# Patient Record
Sex: FEMALE | Race: OTHER | HISPANIC OR LATINO | Employment: FULL TIME | URBAN - METROPOLITAN AREA
[De-identification: names, ages, dates, MRNs, and addresses within clinical notes are randomized per-mention and may not be internally consistent; named-entity substitution may affect disease eponyms.]

---

## 2021-05-25 ENCOUNTER — TELEPHONE (OUTPATIENT)
Dept: SCHEDULING | Facility: IMAGING CENTER | Age: 31
End: 2021-05-25

## 2021-06-03 SDOH — ECONOMIC STABILITY: INCOME INSECURITY: IN THE LAST 12 MONTHS, WAS THERE A TIME WHEN YOU WERE NOT ABLE TO PAY THE MORTGAGE OR RENT ON TIME?: NO

## 2021-06-03 SDOH — ECONOMIC STABILITY: TRANSPORTATION INSECURITY
IN THE PAST 12 MONTHS, HAS LACK OF TRANSPORTATION KEPT YOU FROM MEETINGS, WORK, OR FROM GETTING THINGS NEEDED FOR DAILY LIVING?: NO

## 2021-06-03 SDOH — ECONOMIC STABILITY: HOUSING INSECURITY
IN THE LAST 12 MONTHS, WAS THERE A TIME WHEN YOU DID NOT HAVE A STEADY PLACE TO SLEEP OR SLEPT IN A SHELTER (INCLUDING NOW)?: NO

## 2021-06-03 SDOH — HEALTH STABILITY: MENTAL HEALTH
STRESS IS WHEN SOMEONE FEELS TENSE, NERVOUS, ANXIOUS, OR CAN'T SLEEP AT NIGHT BECAUSE THEIR MIND IS TROUBLED. HOW STRESSED ARE YOU?: ONLY A LITTLE

## 2021-06-03 SDOH — HEALTH STABILITY: PHYSICAL HEALTH: ON AVERAGE, HOW MANY MINUTES DO YOU ENGAGE IN EXERCISE AT THIS LEVEL?: 30 MIN

## 2021-06-03 SDOH — ECONOMIC STABILITY: TRANSPORTATION INSECURITY
IN THE PAST 12 MONTHS, HAS LACK OF RELIABLE TRANSPORTATION KEPT YOU FROM MEDICAL APPOINTMENTS, MEETINGS, WORK OR FROM GETTING THINGS NEEDED FOR DAILY LIVING?: NO

## 2021-06-03 SDOH — ECONOMIC STABILITY: HOUSING INSECURITY: IN THE LAST 12 MONTHS, HOW MANY PLACES HAVE YOU LIVED?: 2

## 2021-06-03 SDOH — ECONOMIC STABILITY: FOOD INSECURITY: WITHIN THE PAST 12 MONTHS, THE FOOD YOU BOUGHT JUST DIDN'T LAST AND YOU DIDN'T HAVE MONEY TO GET MORE.: NEVER TRUE

## 2021-06-03 SDOH — ECONOMIC STABILITY: INCOME INSECURITY: HOW HARD IS IT FOR YOU TO PAY FOR THE VERY BASICS LIKE FOOD, HOUSING, MEDICAL CARE, AND HEATING?: NOT VERY HARD

## 2021-06-03 SDOH — ECONOMIC STABILITY: FOOD INSECURITY: WITHIN THE PAST 12 MONTHS, YOU WORRIED THAT YOUR FOOD WOULD RUN OUT BEFORE YOU GOT MONEY TO BUY MORE.: NEVER TRUE

## 2021-06-03 SDOH — ECONOMIC STABILITY: TRANSPORTATION INSECURITY
IN THE PAST 12 MONTHS, HAS THE LACK OF TRANSPORTATION KEPT YOU FROM MEDICAL APPOINTMENTS OR FROM GETTING MEDICATIONS?: NO

## 2021-06-03 SDOH — HEALTH STABILITY: PHYSICAL HEALTH: ON AVERAGE, HOW MANY DAYS PER WEEK DO YOU ENGAGE IN MODERATE TO STRENUOUS EXERCISE (LIKE A BRISK WALK)?: 1 DAY

## 2021-06-03 ASSESSMENT — SOCIAL DETERMINANTS OF HEALTH (SDOH)
HOW OFTEN DO YOU ATTEND CHURCH OR RELIGIOUS SERVICES?: NEVER
HOW OFTEN DO YOU ATTEND CHURCH OR RELIGIOUS SERVICES?: NEVER
WITHIN THE PAST 12 MONTHS, YOU WORRIED THAT YOUR FOOD WOULD RUN OUT BEFORE YOU GOT THE MONEY TO BUY MORE: NEVER TRUE
HOW HARD IS IT FOR YOU TO PAY FOR THE VERY BASICS LIKE FOOD, HOUSING, MEDICAL CARE, AND HEATING?: NOT VERY HARD
HOW OFTEN DO YOU HAVE A DRINK CONTAINING ALCOHOL: MONTHLY OR LESS
HOW OFTEN DO YOU ATTENT MEETINGS OF THE CLUB OR ORGANIZATION YOU BELONG TO?: NEVER
HOW OFTEN DO YOU GET TOGETHER WITH FRIENDS OR RELATIVES?: ONCE A WEEK
DO YOU BELONG TO ANY CLUBS OR ORGANIZATIONS SUCH AS CHURCH GROUPS UNIONS, FRATERNAL OR ATHLETIC GROUPS, OR SCHOOL GROUPS?: NO
HOW MANY DRINKS CONTAINING ALCOHOL DO YOU HAVE ON A TYPICAL DAY WHEN YOU ARE DRINKING: 1 OR 2
IN A TYPICAL WEEK, HOW MANY TIMES DO YOU TALK ON THE PHONE WITH FAMILY, FRIENDS, OR NEIGHBORS?: THREE TIMES A WEEK
HOW OFTEN DO YOU ATTENT MEETINGS OF THE CLUB OR ORGANIZATION YOU BELONG TO?: NEVER
DO YOU BELONG TO ANY CLUBS OR ORGANIZATIONS SUCH AS CHURCH GROUPS UNIONS, FRATERNAL OR ATHLETIC GROUPS, OR SCHOOL GROUPS?: NO
IN A TYPICAL WEEK, HOW MANY TIMES DO YOU TALK ON THE PHONE WITH FAMILY, FRIENDS, OR NEIGHBORS?: THREE TIMES A WEEK
HOW OFTEN DO YOU HAVE SIX OR MORE DRINKS ON ONE OCCASION: LESS THAN MONTHLY
HOW OFTEN DO YOU GET TOGETHER WITH FRIENDS OR RELATIVES?: ONCE A WEEK

## 2021-06-03 ASSESSMENT — LIFESTYLE VARIABLES
HOW OFTEN DO YOU HAVE SIX OR MORE DRINKS ON ONE OCCASION: LESS THAN MONTHLY
HOW OFTEN DO YOU HAVE A DRINK CONTAINING ALCOHOL: MONTHLY OR LESS
HOW MANY STANDARD DRINKS CONTAINING ALCOHOL DO YOU HAVE ON A TYPICAL DAY: 1 OR 2

## 2021-06-04 ENCOUNTER — HOSPITAL ENCOUNTER (OUTPATIENT)
Dept: LAB | Facility: MEDICAL CENTER | Age: 31
End: 2021-06-04
Attending: FAMILY MEDICINE
Payer: COMMERCIAL

## 2021-06-04 ENCOUNTER — OFFICE VISIT (OUTPATIENT)
Dept: MEDICAL GROUP | Facility: MEDICAL CENTER | Age: 31
End: 2021-06-04
Payer: COMMERCIAL

## 2021-06-04 VITALS
DIASTOLIC BLOOD PRESSURE: 80 MMHG | HEART RATE: 70 BPM | TEMPERATURE: 98.7 F | BODY MASS INDEX: 38.09 KG/M2 | RESPIRATION RATE: 16 BRPM | WEIGHT: 215 LBS | HEIGHT: 63 IN | OXYGEN SATURATION: 97 % | SYSTOLIC BLOOD PRESSURE: 118 MMHG

## 2021-06-04 DIAGNOSIS — R19.7 DIARRHEA DUE TO MALABSORPTION: ICD-10-CM

## 2021-06-04 DIAGNOSIS — Z13.220 ENCOUNTER FOR LIPID SCREENING FOR CARDIOVASCULAR DISEASE: ICD-10-CM

## 2021-06-04 DIAGNOSIS — N93.9 ABNORMAL UTERINE BLEEDING: ICD-10-CM

## 2021-06-04 DIAGNOSIS — K90.9 DIARRHEA DUE TO MALABSORPTION: ICD-10-CM

## 2021-06-04 DIAGNOSIS — E66.9 OBESITY (BMI 35.0-39.9 WITHOUT COMORBIDITY): ICD-10-CM

## 2021-06-04 DIAGNOSIS — Z13.21 ENCOUNTER FOR VITAMIN DEFICIENCY SCREENING: ICD-10-CM

## 2021-06-04 DIAGNOSIS — Z13.6 ENCOUNTER FOR LIPID SCREENING FOR CARDIOVASCULAR DISEASE: ICD-10-CM

## 2021-06-04 LAB
ALBUMIN SERPL BCP-MCNC: 4.2 G/DL (ref 3.2–4.9)
ALBUMIN/GLOB SERPL: 1.2 G/DL
ALP SERPL-CCNC: 89 U/L (ref 30–99)
ALT SERPL-CCNC: 14 U/L (ref 2–50)
ANION GAP SERPL CALC-SCNC: 12 MMOL/L (ref 7–16)
AST SERPL-CCNC: 12 U/L (ref 12–45)
BILIRUB SERPL-MCNC: 0.3 MG/DL (ref 0.1–1.5)
BUN SERPL-MCNC: 9 MG/DL (ref 8–22)
CALCIUM SERPL-MCNC: 8.9 MG/DL (ref 8.4–10.2)
CHLORIDE SERPL-SCNC: 106 MMOL/L (ref 96–112)
CHOLEST SERPL-MCNC: 184 MG/DL (ref 100–199)
CO2 SERPL-SCNC: 21 MMOL/L (ref 20–33)
CREAT SERPL-MCNC: 0.43 MG/DL (ref 0.5–1.4)
ERYTHROCYTE [DISTWIDTH] IN BLOOD BY AUTOMATED COUNT: 44 FL (ref 35.9–50)
FASTING STATUS PATIENT QL REPORTED: NORMAL
GLOBULIN SER CALC-MCNC: 3.4 G/DL (ref 1.9–3.5)
GLUCOSE SERPL-MCNC: 97 MG/DL (ref 65–99)
HCT VFR BLD AUTO: 38.1 % (ref 37–47)
HDLC SERPL-MCNC: 36 MG/DL
HGB BLD-MCNC: 12.4 G/DL (ref 12–16)
LDLC SERPL CALC-MCNC: 124 MG/DL
MCH RBC QN AUTO: 27.2 PG (ref 27–33)
MCHC RBC AUTO-ENTMCNC: 32.5 G/DL (ref 33.6–35)
MCV RBC AUTO: 83.6 FL (ref 81.4–97.8)
PLATELET # BLD AUTO: 280 K/UL (ref 164–446)
PMV BLD AUTO: 10.2 FL (ref 9–12.9)
POTASSIUM SERPL-SCNC: 4.5 MMOL/L (ref 3.6–5.5)
PROT SERPL-MCNC: 7.6 G/DL (ref 6–8.2)
RBC # BLD AUTO: 4.56 M/UL (ref 4.2–5.4)
SODIUM SERPL-SCNC: 139 MMOL/L (ref 135–145)
TRIGL SERPL-MCNC: 120 MG/DL (ref 0–149)
WBC # BLD AUTO: 7.2 K/UL (ref 4.8–10.8)

## 2021-06-04 PROCEDURE — 99204 OFFICE O/P NEW MOD 45 MIN: CPT | Performed by: FAMILY MEDICINE

## 2021-06-04 PROCEDURE — 80061 LIPID PANEL: CPT

## 2021-06-04 PROCEDURE — 85027 COMPLETE CBC AUTOMATED: CPT

## 2021-06-04 PROCEDURE — 82306 VITAMIN D 25 HYDROXY: CPT

## 2021-06-04 PROCEDURE — 36415 COLL VENOUS BLD VENIPUNCTURE: CPT

## 2021-06-04 PROCEDURE — 80053 COMPREHEN METABOLIC PANEL: CPT

## 2021-06-04 RX ORDER — ETONOGESTREL 68 MG/1
1 IMPLANT SUBCUTANEOUS ONCE
COMMUNITY
End: 2021-07-20

## 2021-06-04 ASSESSMENT — ENCOUNTER SYMPTOMS
SHORTNESS OF BREATH: 0
DIARRHEA: 0
BLURRED VISION: 0
WEIGHT LOSS: 0
ABDOMINAL PAIN: 1
DOUBLE VISION: 0
FEVER: 0
PALPITATIONS: 0
COUGH: 0
MYALGIAS: 0
NAUSEA: 0
CHILLS: 0
DIZZINESS: 0
WEAKNESS: 0
CONSTIPATION: 0
BRUISES/BLEEDS EASILY: 0
DEPRESSION: 0

## 2021-06-04 ASSESSMENT — PATIENT HEALTH QUESTIONNAIRE - PHQ9: CLINICAL INTERPRETATION OF PHQ2 SCORE: 0

## 2021-06-04 NOTE — PROGRESS NOTES
lApa Ordoñez is a pleasant 30 y.o. female here to establish care.    HPI:   Diarrhea due to malabsorption  Patient reports that she has been seen by gastroenterology in the past was diagnosed with GERD.  She recently was seen at a urgent care in [facility] and was told that she may have irritable bowel.    Patient states that ever since she had her gallbladder removed back in 2011 she has been having episodes where shortly after she eats she needs to have a bowel movement.  She describes her stool as yellow and watery during those events.  She states that she does not notice a change when she eats fatty foods but when she drinks something like a protein shake she will noticed increase in her symptoms.    Since moving to [place] she has made improvements upon her diet which she has notices improved her symptoms.  At this time she is not interested in gastroenterology but will reach out if her symptoms worsen.    Abnormal uterine bleeding  Patient reports that she has been having difficulty with her menstruation ever since she started having her periods.  She does report a positive family history for her cervical cancer, ovarian cancer, and fibroids.  She became concerned after speaking with family as they were displaying similar symptoms that she has.  She reports intense amount of pain and mood changes prior to her menstrual cycle.  She has been seen by GYN in the past and was placed on hormonal contraception, Nexplanon.  She states that for 2 years she was symptom-free along with free of her period.  She has noticed that over the last year her symptoms have been slowly increasing and becoming more frequent.  She would like to follow-up with GYN as she is interested in getting a hysterectomy or an ablation to help with her symptoms.      Health Maintenance  Patient is due for her Pap smear but like to be seen by GYN to schedule this.    Current medicines (including changes today)  Current Outpatient  Medications   Medication Sig Dispense Refill   • etonogestrel (NEXPLANON) 68 MG Implant implant Inject 1 Each under the skin one time. Placed 2018       No current facility-administered medications for this visit.       Past Medical/ Surgical History  She  has a past medical history of Allergy, Anemia, Anxiety, and Depression.  She  has a past surgical history that includes cholecystectomy ().    Social History  Social History     Tobacco Use   • Smoking status: Never Smoker   • Smokeless tobacco: Never Used   Vaping Use   • Vaping Use: Some days   • Substances: THC, CBD   Substance Use Topics   • Alcohol use: Yes     Comment: ragini   • Drug use: Yes     Types: Marijuana     Social History     Social History Narrative   • Not on file        Family History  Family History   Problem Relation Age of Onset   • Depression Mother    • Other Mother         fatigue and dizziness   • Obesity Father    • Allergies Brother    • Arthritis Maternal Grandmother    • Hypertension Maternal Grandmother    • Diabetes Maternal Grandmother    • Diabetes Maternal Grandfather    • Cancer Paternal Grandmother         ovarian   • Hypertension Paternal Grandmother    • Hypertension Paternal Grandfather    • Parkinson's Disease Paternal Grandfather    • Alzheimer's Disease Paternal Grandfather    • Cancer Maternal Aunt         cervical   • Other Maternal Aunt         fibroids     Family Status   Relation Name Status   • Mo  Alive   • Fa  Alive   • Bro Bin Alive   • MGMo  Alive   • MGFa     • PGMo  Alive   • PGFa  Alive   • MAunt  Alive         Review of Systems   Constitutional: Negative for chills, fever and weight loss.   HENT: Negative for hearing loss.    Eyes: Negative for blurred vision and double vision.   Respiratory: Negative for cough and shortness of breath.    Cardiovascular: Negative for chest pain, palpitations and leg swelling.   Gastrointestinal: Positive for abdominal pain (Lower abdominal cramping  "associated with it.). Negative for constipation, diarrhea and nausea.   Genitourinary: Negative.    Musculoskeletal: Negative for myalgias.   Skin: Negative for rash.   Neurological: Negative for dizziness and weakness.   Endo/Heme/Allergies: Does not bruise/bleed easily.   Psychiatric/Behavioral: Negative for depression.         Objective:     /80 (BP Location: Left arm, Patient Position: Sitting, BP Cuff Size: Adult)   Pulse 70   Temp 37.1 °C (98.7 °F) (Temporal)   Resp 16   Ht 1.6 m (5' 3\")   Wt 97.5 kg (215 lb)   SpO2 97%  Body mass index is 38.09 kg/m².    Physical Exam  Constitutional:       General: She is not in acute distress.  HENT:      Head: Normocephalic and atraumatic.      Right Ear: External ear normal.      Left Ear: External ear normal.   Eyes:      Conjunctiva/sclera: Conjunctivae normal.      Pupils: Pupils are equal, round, and reactive to light.   Cardiovascular:      Rate and Rhythm: Normal rate and regular rhythm.      Heart sounds: No murmur heard.   No friction rub. No gallop.    Pulmonary:      Effort: Pulmonary effort is normal.      Breath sounds: Normal breath sounds. No wheezing or rales.   Abdominal:      General: Bowel sounds are normal.      Palpations: Abdomen is soft.      Tenderness: There is abdominal tenderness (Menstruation present.) in the right lower quadrant and left lower quadrant.   Musculoskeletal:      Cervical back: Normal range of motion and neck supple.   Skin:     General: Skin is warm and dry.      Findings: No rash.   Neurological:      Mental Status: She is alert and oriented to person, place, and time.      Gait: Gait is intact.   Psychiatric:         Mood and Affect: Affect normal.        Imaging:  No recent imaging to review    Labs:  No recent blood work to review  Assessment and Plan:   The following treatment plan was discussed    1. Diarrhea due to malabsorption  Chronic, controlled.  Patient was not interested in seeing gastroenterology at " this time as she has made dietary changes which showed improvement in her symptoms.  We will go ahead and check her chemistry to ensure that this is not related to an electrolyte abnormality  - ESTIMATED GFR; Future  - Comp Metabolic Panel; Future    2. Abnormal uterine bleeding  Chronic, uncontrolled.  Referral has been placed to GYN as patient is interested in surgical procedures to help with her dysmenorrhea.  She is also interested in getting her Nexplanon removed.  - REFERRAL TO GYNECOLOGY  - CBC WITHOUT DIFFERENTIAL; Future    3. Encounter for lipid screening for cardiovascular disease  Patient would like to have their blood work done, no recent blood work completed.  Orders placed.  - Lipid Profile; Future    4. Encounter for vitamin deficiency screening  Patient would like to have their blood work done, no recent blood work completed.  Orders placed.  - VITAMIN D,25 HYDROXY; Future    5. Obesity (BMI 35.0-39.9 without comorbidity)  We will discuss this more in depth and unexplained together.  - Patient identified as having weight management issue.  Appropriate orders and counseling given.      Records requested.  Followup: Return in about 6 weeks (around 7/16/2021) for follow-up.    Please note that this dictation was created using voice recognition software. I have made every reasonable attempt to correct obvious errors, but I expect that there are errors of grammar and possibly content that I did not discover before finalizing the note.

## 2021-06-04 NOTE — ASSESSMENT & PLAN NOTE
Patient reports that she has been seen by gastroenterology in the past was diagnosed with GERD.  She recently was seen at a urgent care in [facility] and was told that she may have irritable bowel.    Patient states that ever since she had her gallbladder removed back in 2011 she has been having episodes where shortly after she eats she needs to have a bowel movement.  She describes her stool as yellow and watery during those events.  She states that she does not notice a change when she eats fatty foods but when she drinks something like a protein shake she will noticed increase in her symptoms.    Since moving to [place] she has made improvements upon her diet which she has notices improved her symptoms.  At this time she is not interested in gastroenterology but will reach out if her symptoms worsen.

## 2021-06-04 NOTE — ASSESSMENT & PLAN NOTE
Patient reports that she has been having difficulty with her menstruation ever since she started having her periods.  She does report a positive family history for her cervical cancer, ovarian cancer, and fibroids.  She became concerned after speaking with family as they were displaying similar symptoms that she has.  She reports intense amount of pain and mood changes prior to her menstrual cycle.  She has been seen by GYN in the past and was placed on hormonal contraception, Nexplanon.  She states that for 2 years she was symptom-free along with free of her period.  She has noticed that over the last year her symptoms have been slowly increasing and becoming more frequent.  She would like to follow-up with GYN as she is interested in getting a hysterectomy or an ablation to help with her symptoms.

## 2021-06-06 LAB — 25(OH)D3 SERPL-MCNC: 10 NG/ML (ref 30–80)

## 2021-06-29 ENCOUNTER — PATIENT MESSAGE (OUTPATIENT)
Dept: MEDICAL GROUP | Facility: MEDICAL CENTER | Age: 31
End: 2021-06-29

## 2021-06-29 DIAGNOSIS — Z12.83 SKIN CANCER SCREENING: ICD-10-CM

## 2021-06-30 ENCOUNTER — HOSPITAL ENCOUNTER (OUTPATIENT)
Facility: MEDICAL CENTER | Age: 31
End: 2021-06-30
Attending: OBSTETRICS & GYNECOLOGY
Payer: COMMERCIAL

## 2021-06-30 ENCOUNTER — GYNECOLOGY VISIT (OUTPATIENT)
Dept: OBGYN | Facility: CLINIC | Age: 31
End: 2021-06-30
Payer: COMMERCIAL

## 2021-06-30 VITALS — DIASTOLIC BLOOD PRESSURE: 80 MMHG | BODY MASS INDEX: 38.26 KG/M2 | SYSTOLIC BLOOD PRESSURE: 124 MMHG | WEIGHT: 216 LBS

## 2021-06-30 DIAGNOSIS — Z12.4 CERVICAL CANCER SCREENING: ICD-10-CM

## 2021-06-30 DIAGNOSIS — N93.9 ABNORMAL UTERINE BLEEDING (AUB): ICD-10-CM

## 2021-06-30 DIAGNOSIS — Z97.5 BREAKTHROUGH BLEEDING ON NEXPLANON: ICD-10-CM

## 2021-06-30 DIAGNOSIS — N92.1 BREAKTHROUGH BLEEDING ON NEXPLANON: ICD-10-CM

## 2021-06-30 DIAGNOSIS — Z01.419 ENCNTR FOR GYN EXAM (GENERAL) (ROUTINE) W/O ABN FINDINGS: ICD-10-CM

## 2021-06-30 DIAGNOSIS — E66.9 OBESITY (BMI 35.0-39.9 WITHOUT COMORBIDITY): ICD-10-CM

## 2021-06-30 DIAGNOSIS — Z30.09 STERILIZATION CONSULT: ICD-10-CM

## 2021-06-30 PROCEDURE — 99385 PREV VISIT NEW AGE 18-39: CPT | Performed by: OBSTETRICS & GYNECOLOGY

## 2021-06-30 PROCEDURE — 88175 CYTOPATH C/V AUTO FLUID REDO: CPT

## 2021-06-30 PROCEDURE — 87624 HPV HI-RISK TYP POOLED RSLT: CPT

## 2021-06-30 ASSESSMENT — FIBROSIS 4 INDEX: FIB4 SCORE: 0.37

## 2021-06-30 NOTE — NON-PROVIDER
New patient - Annual  LMP: 6/21/21  Phone: [phone]  Pharmacy verified   Pt would like to discuss Nexplanon removal and Hysterectomy. Pt has Hx of painful periods x 15 years

## 2021-06-30 NOTE — PROGRESS NOTES
Subjective:      Alpa Ordoñez is a 30 y.o. female who presents as New Patient (Annual)            HPI patient is a 30-year-old  0 who presents today as a new patient for annual gynecologic exam with several complaints.  Patient was previously living in [place] before she moved here.  She had Nexplanon placed in [place] and Nexplanon has been in place for 3 years in about 3 months.  Patient reports 6+ month of abnormal unpredictable bleeding.  Prior to this she has had amenorrhea since Nexplanon placement.  She also reports significant cramping and pelvic discomfort with bleeding.  She does not have much pain symptoms outside of her bleeding episodes.    Patient reports no history of PID STDs or abnormal Pap smears.  Her last Pap smear was 3+ years ago.  Patient is sexually active and states she has been using condoms.    Patient states she does not want any pregnancy in the future and would like to discuss sterilization option versus hysterectomy versus ablation for bleeding control.    She has had some chronic intermittent loose bowel movements since gallbladder surgery and states she is being worked up for possible IBS also by her PCP.    Reports no changes in bladder functions.    ROS all organ systems are reviewed and were negative except for complaints in HPI       Objective:     /80 (BP Location: Right arm, Patient Position: Sitting, BP Cuff Size: Adult long)   Wt 98 kg (216 lb)   LMP 2021   BMI 38.26 kg/m²      Physical Exam  Vitals and nursing note reviewed. Exam conducted with a chaperone present.   Constitutional:       General: She is not in acute distress.     Appearance: Normal appearance. She is obese. She is not toxic-appearing.   HENT:      Head: Normocephalic and atraumatic.      Nose: Nose normal.   Eyes:      General: No scleral icterus.        Right eye: No discharge.         Left eye: No discharge.      Conjunctiva/sclera: Conjunctivae normal.   Cardiovascular:       Rate and Rhythm: Normal rate and regular rhythm.      Pulses: Normal pulses.      Heart sounds: Normal heart sounds. No murmur heard.   No gallop.    Pulmonary:      Effort: Pulmonary effort is normal. No respiratory distress.      Breath sounds: Normal breath sounds. No wheezing.   Chest:      Breasts:         Right: No swelling, bleeding, inverted nipple, mass, nipple discharge, skin change or tenderness.         Left: No swelling, bleeding, inverted nipple, mass, nipple discharge, skin change or tenderness.   Abdominal:      General: Abdomen is flat. Bowel sounds are normal. There is no distension.      Palpations: Abdomen is soft.      Tenderness: There is no abdominal tenderness. There is no right CVA tenderness or left CVA tenderness.   Genitourinary:     General: Normal vulva.      Labia:         Right: No rash, tenderness, lesion or injury.         Left: No rash, tenderness, lesion or injury.       Urethra: No prolapse.      Vagina: No vaginal discharge, tenderness, bleeding or lesions.      Cervix: No cervical motion tenderness, discharge, friability, lesion or erythema.      Uterus: Not enlarged and not tender.       Adnexa:         Right: No mass, tenderness or fullness.          Left: No mass, tenderness or fullness.        Rectum: No external hemorrhoid.      Comments: Bimanual exam is limited by habitus  Musculoskeletal:         General: No swelling. Normal range of motion.      Cervical back: Normal range of motion and neck supple. No rigidity.      Right lower leg: No edema.      Left lower leg: No edema.   Lymphadenopathy:      Cervical: No cervical adenopathy.      Upper Body:      Right upper body: No supraclavicular or axillary adenopathy.      Left upper body: No supraclavicular or axillary adenopathy.      Lower Body: No right inguinal adenopathy. No left inguinal adenopathy.   Skin:     General: Skin is warm and dry.      Coloration: Skin is not jaundiced.      Findings: No bruising.    Neurological:      General: No focal deficit present.      Mental Status: She is alert and oriented to person, place, and time.      Cranial Nerves: No cranial nerve deficit.      Motor: No weakness.   Psychiatric:         Mood and Affect: Mood normal.         Behavior: Behavior normal.         Thought Content: Thought content normal.         Judgment: Judgment normal.              Discussion:    I had an extensive discussion with patient regarding abnormal uterine bleeding/breakthrough bleeding with Nexplanon and I discussed that since her Nexplanon is passed its removal time, that some patient can have abnormal unpredictable bleeding because of this.  She will follow-up for Nexplanon removal.  I discussed contraception options available including different hormonal therapy with patient desires permanent sterilization.  I discussed problems with sterilization including regret with patient desires bilateral salpingectomy.  Patient states she has always had bothersome bleeding and a lot of pain and discomfort during her bleeding and she has no plan for any future pregnancy.  She wants to discuss endometrial ablation versus hysterectomy and I discussed the pros and cons risk benefits and potential complications from each procedure.  I discussed hysterectomy will guarantee her abnormal future bleeding and that success rate from ablation ranges from 53 to 92% and I discussed successes measures any improvement in her symptoms.complete cessation of her menstruation is not guaranteed after ablation but she does have a high chance of having complete cessation after ablation.  After discussion of pros and cons of hysterectomy versus ablation, patient states she would like to try endometrial ablation first and if ablation does not work she will consider hysterectomy in the future.  Insurance authorization for sterilization via bilateral salpingectomy and also endometrial ablation was sent to insurance.          Assessment/Plan:        1. Encntr for gyn exam (general) (routine) w/o abn findings  30-year-old  0 here for annual gynecologic exam.  Exam is within normal Limits    2. Breakthrough bleeding on Nexplanon  We discussed breakthrough bleeding and treatment options.  Patient will follow up for Nexplanon removal.  Desires endometrial ablation.  Insurance authorization requested  - THINPREP PAP WITH HPV; Future  - US-PELVIC TRANSVAGINAL ONLY; Future    3. Cervical cancer screening  Pap smear obtained and screening recommendations were reviewed    4. Abnormal uterine bleeding (AUB)  We discussed abnormal uterine bleeding and possible etiology but her bleeding is likely hormonal since she has Nexplanon still in place.  Pelvic ultrasound ordered  - REFERRAL TO OB/GYN SURGERY    5. Sterilization consult  Patient desires permanent sterilization and we discussed risk benefits potential complications.  Desires to proceed with bilateral salpingectomy and insurance authorization was requested  - REFERRAL TO OB/GYN SURGERY    6. Obesity (BMI 35.0-39.9 without comorbidity) (HCC)  Obesity and risks were discussed.  Diet exercise and weight management reviewed    7.  Precautions and plan of care were reviewed.  30 minutes of today's visit was spent to discuss abnormal uterine bleeding and treatment options and also discussion of sterilization and other treatment options including long-acting reversible forms of contraception.

## 2021-07-01 LAB
CYTOLOGY REG CYTOL: NORMAL
HPV HR 12 DNA CVX QL NAA+PROBE: NEGATIVE
HPV16 DNA SPEC QL NAA+PROBE: NEGATIVE
HPV18 DNA SPEC QL NAA+PROBE: NEGATIVE
SPECIMEN SOURCE: NORMAL

## 2021-07-20 ENCOUNTER — GYNECOLOGY VISIT (OUTPATIENT)
Dept: OBGYN | Facility: CLINIC | Age: 31
End: 2021-07-20
Payer: COMMERCIAL

## 2021-07-20 VITALS — BODY MASS INDEX: 38.44 KG/M2 | WEIGHT: 217 LBS | SYSTOLIC BLOOD PRESSURE: 127 MMHG | DIASTOLIC BLOOD PRESSURE: 79 MMHG

## 2021-07-20 DIAGNOSIS — Z30.46 NEXPLANON REMOVAL: ICD-10-CM

## 2021-07-20 PROCEDURE — 11982 REMOVE DRUG IMPLANT DEVICE: CPT | Performed by: OBSTETRICS & GYNECOLOGY

## 2021-07-20 ASSESSMENT — FIBROSIS 4 INDEX: FIB4 SCORE: 0.37

## 2021-07-20 NOTE — NON-PROVIDER
Pt here for Nexplanon Removal   Pt states no concerns   Good # [phone]   Pharmacy verified.

## 2021-07-20 NOTE — PROGRESS NOTES
Patient here for Nexplanon removal. She plans endometrial ablation with tubal ligation in the near future. Has appointment for pelvic ultrasound. Will use condoms in the meanwhile for contraception.

## 2021-07-20 NOTE — PROCEDURES
Nexplanon Removal :    Patient given informed consent, and is aware that removal may take longer, require more anesthesia and time.  It also can make a scar slightly, but not substantially larger, than that used for insertion .   Patient is aware that we recommend alternative contraception, until she achieves a normal cycle after removal. Patient is aware that removal, requires identification of the entire implant, either by visual inspection or by xray imagery.     After prepped and draping of left upper inner forearm, implant visually identified and site of insertion marked.   Lidocaine 1% with epi infiltrated both distal and deep to the prior insertion site for anesthesia.   Utilizing a scalpel # 15, a small vertical incision was made near scar of prior insertion site in the site of maximal implant visualization.   Implant identified and capsule was incised with scalpel.  Implant grasped with mosquito forceps and was easily removed, examined and found to be intact/complete.     Hemostasis at removal site achieved with pressure.  Incision closed with Steri Strips. The forearm was then wrapped with a pressure dressing.   Instructions for post operative care given .   Patient tolerated the procedure well.     Remove outer adhesive roll gauze after 24 hrs and steri strips after 3-4 days.

## 2021-07-30 ENCOUNTER — HOSPITAL ENCOUNTER (OUTPATIENT)
Dept: RADIOLOGY | Facility: MEDICAL CENTER | Age: 31
End: 2021-07-30
Attending: OBSTETRICS & GYNECOLOGY
Payer: COMMERCIAL

## 2021-07-30 DIAGNOSIS — Z97.5 BREAKTHROUGH BLEEDING ON NEXPLANON: ICD-10-CM

## 2021-07-30 DIAGNOSIS — N92.1 BREAKTHROUGH BLEEDING ON NEXPLANON: ICD-10-CM

## 2021-07-30 PROCEDURE — 76830 TRANSVAGINAL US NON-OB: CPT

## 2021-08-01 ENCOUNTER — PATIENT MESSAGE (OUTPATIENT)
Dept: MEDICAL GROUP | Facility: MEDICAL CENTER | Age: 31
End: 2021-08-01

## 2021-08-01 DIAGNOSIS — G56.03 BILATERAL CARPAL TUNNEL SYNDROME: ICD-10-CM

## 2021-08-02 ENCOUNTER — TELEPHONE (OUTPATIENT)
Dept: OBGYN | Facility: CLINIC | Age: 31
End: 2021-08-02

## 2021-08-02 NOTE — TELEPHONE ENCOUNTER
----- Message from Pramod Hogan M.D. sent at 8/1/2021 10:54 AM PDT -----  Please let patient know that her ultrasound shows normal size uterus.  She has a cyst on her left ovary which usually resolve in time.  If patient has laparoscopy and the cyst is still present, it can be removed at the same time      Called pt and notified as above. Pt agreed and verbalized understanding.   Pt informed per [facility] surgery scheduler he will be calling pt after tomorrow once he now more about Dr. Hogan's surgery days. Pt verbalized understanding.

## 2021-08-02 NOTE — PROGRESS NOTES
Patient reports that she is concerned for possible nerve damage secondary to her Boby & Boby vaccine after she read that it may cause nerve damage.  She is concerned as her carpal tunnel has worsened and would like a nerve conduction study completed.  Order was placed.

## 2021-08-17 ENCOUNTER — TELEMEDICINE (OUTPATIENT)
Dept: ADMISSIONS | Facility: MEDICAL CENTER | Age: 31
End: 2021-08-17
Attending: OBSTETRICS & GYNECOLOGY
Payer: COMMERCIAL

## 2021-08-17 ENCOUNTER — GYNECOLOGY VISIT (OUTPATIENT)
Dept: OBGYN | Facility: CLINIC | Age: 31
End: 2021-08-17
Payer: COMMERCIAL

## 2021-08-17 VITALS — BODY MASS INDEX: 35.79 KG/M2 | WEIGHT: 202 LBS | HEIGHT: 63 IN

## 2021-08-17 VITALS
DIASTOLIC BLOOD PRESSURE: 68 MMHG | HEIGHT: 63 IN | SYSTOLIC BLOOD PRESSURE: 116 MMHG | WEIGHT: 204 LBS | BODY MASS INDEX: 36.14 KG/M2

## 2021-08-17 DIAGNOSIS — Z97.5 BREAKTHROUGH BLEEDING ON NEXPLANON: ICD-10-CM

## 2021-08-17 DIAGNOSIS — N83.202 CYST OF LEFT OVARY: ICD-10-CM

## 2021-08-17 DIAGNOSIS — N92.1 BREAKTHROUGH BLEEDING ON NEXPLANON: ICD-10-CM

## 2021-08-17 DIAGNOSIS — Z30.09 ENCOUNTER FOR STERILIZATION EDUCATION: ICD-10-CM

## 2021-08-17 PROCEDURE — 99999 PR NO CHARGE: CPT | Performed by: OBSTETRICS & GYNECOLOGY

## 2021-08-17 ASSESSMENT — FIBROSIS 4 INDEX
FIB4 SCORE: 0.37
FIB4 SCORE: 0.37

## 2021-08-17 NOTE — PROGRESS NOTES
Soraida Ordoñez is a 30 y.o. female who presents for Gynecologic Exam (Pre-Op)            HPI patient is a 30-year-old  0 who presents today to discuss surgery.  Patient desires no future pregnancies and desires permanent sterilization because she does not want to use contraception anymore.  Patient has had history of heavy menstruation but after using Nexplanon, she had amenorrhea until the last 6 months of Nexplanon use and during that time she started to experience some breakthrough bleeding.  After Nexplanon was removed, bleeding cycles became regular.  Patient reports that due to her history of heavy menstruation previously, she would like endometrial ablation and this procedure was explained in detail previously.  Currently patient is doing well without complaints and would like to proceed with laparoscopic bilateral salpingectomy for permanent sterilization along with endometrial ablation to reduce risk of future heavy menstruation.  Patient had a pelvic ultrasound which was normal except for left ovarian cyst    ROS all organ systems were reviewed and are currently negative        Past Medical History:   Diagnosis Date   • Allergy     receving vaccnines to help   • Anemia    • Anxiety    • Depression      Past Surgical History:   Procedure Laterality Date   • CHOLECYSTECTOMY       OB History   No obstetric history on file.     Social History     Socioeconomic History   • Marital status:      Spouse name: Not on file   • Number of children: Not on file   • Years of education: Not on file   • Highest education level: Some college, no degree   Occupational History   • Not on file   Tobacco Use   • Smoking status: Never Smoker   • Smokeless tobacco: Never Used   Vaping Use   • Vaping Use: Some days   • Substances: THC, CBD   Substance and Sexual Activity   • Alcohol use: Yes     Comment: ragini   • Drug use: Yes     Types: Marijuana   • Sexual activity: Yes     Partners: Male      Birth control/protection: Implant     Comment:    Other Topics Concern   • Not on file   Social History Narrative   • Not on file     Social Determinants of Health     Financial Resource Strain: Low Risk    • Difficulty of Paying Living Expenses: Not very hard   Food Insecurity: No Food Insecurity   • Worried About Running Out of Food in the Last Year: Never true   • Ran Out of Food in the Last Year: Never true   Transportation Needs: No Transportation Needs   • Lack of Transportation (Medical): No   • Lack of Transportation (Non-Medical): No   Physical Activity: Insufficiently Active   • Days of Exercise per Week: 1 day   • Minutes of Exercise per Session: 30 min   Stress: No Stress Concern Present   • Feeling of Stress : Only a little   Social Connections: Moderately Isolated   • Frequency of Communication with Friends and Family: Three times a week   • Frequency of Social Gatherings with Friends and Family: Once a week   • Attends Rastafari Services: Never   • Active Member of Clubs or Organizations: No   • Attends Club or Organization Meetings: Never   • Marital Status:    Intimate Partner Violence:    • Fear of Current or Ex-Partner:    • Emotionally Abused:    • Physically Abused:    • Sexually Abused:      Family History   Problem Relation Age of Onset   • Depression Mother    • Other Mother         fatigue and dizziness   • Obesity Father    • Allergies Brother    • Arthritis Maternal Grandmother    • Hypertension Maternal Grandmother    • Diabetes Maternal Grandmother    • Diabetes Maternal Grandfather    • Cancer Paternal Grandmother         ovarian   • Hypertension Paternal Grandmother    • Hypertension Paternal Grandfather    • Parkinson's Disease Paternal Grandfather    • Alzheimer's Disease Paternal Grandfather    • Cancer Maternal Aunt         cervical   • Other Maternal Aunt         fibroids     No current outpatient medications on file prior to visit.     No current  facility-administered medications on file prior to visit.     Allergies   Allergen Reactions   • Penicillins Swelling     Vaginal infection            Objective   Weight is 204 pounds, blood pressure 116/68 heart rate 66 respiration 18    Physical Exam  Vitals and nursing note reviewed. Exam conducted with a chaperone present.   Constitutional:       General: She is not in acute distress.     Appearance: Normal appearance. She is obese. She is not toxic-appearing.   HENT:      Head: Normocephalic and atraumatic.   Eyes:      General: No scleral icterus.        Right eye: No discharge.         Left eye: No discharge.      Conjunctiva/sclera: Conjunctivae normal.   Cardiovascular:      Rate and Rhythm: Normal rate and regular rhythm.      Pulses: Normal pulses.      Heart sounds: Normal heart sounds. No murmur heard.   No gallop.    Pulmonary:      Effort: Pulmonary effort is normal. No respiratory distress.      Breath sounds: Normal breath sounds. No wheezing.   Abdominal:      General: Abdomen is flat. Bowel sounds are normal. There is no distension.      Palpations: Abdomen is soft.      Tenderness: There is no abdominal tenderness. There is no right CVA tenderness or left CVA tenderness.   Musculoskeletal:         General: Normal range of motion.      Cervical back: Normal range of motion and neck supple. No rigidity.      Right lower leg: No edema.      Left lower leg: No edema.   Lymphadenopathy:      Cervical: No cervical adenopathy.   Skin:     General: Skin is warm and dry.      Findings: No bruising.   Neurological:      General: No focal deficit present.      Mental Status: She is alert and oriented to person, place, and time.      Coordination: Coordination normal.   Psychiatric:         Mood and Affect: Mood normal.         Behavior: Behavior normal.         Thought Content: Thought content normal.         Judgment: Judgment normal.                         Discussion:  Complete discussion regarding the  proposed procedure was conducted both today and throughout the entire mabel-operative period. The procedures: Laparoscopic bilateral salpingectomy, NovaSure endometrial ablation and any indicated procedures  Were specifically addressed. There is increased risk from  any surgical procedure related to  Anesthesia, increased risk of infection, both abdominal and wound infections as well as heavy bleeding, both during surgery, or delayed bleeding. These were discussed as well.     Specific to Ablation:   Patient made aware of risk  of Uterine perforation, infection and Hemorrhage.   Patient made aware that cervical dilatation and intra uterine instruments will be used to allow visualization of the uterine cavity as well as to allow irreversible burn to the endometrium and all endometrial cells that normally bleed monthly. The very nature of said instrumentation puts patient at risk, albeit minimal for the above mentioned complications.  Patient is made aware that although procedure is done for excessive bleeding/out of cycle bleeding, that only around 47% of patients will become amenorrheic post procedure with as many as 87% experiencing marked improvement in their bleeding profile.   Thus patient is aware of the failures in this procedure and inadequate ablation, adenomyosis, enlarged cavity and large submucosal Fibroids are just some of the reasons for failure.   Patient is made aware that every effort has been made to rule out cancer or pre-cancerous lesions before the procedure. Some patients made still develop these neoplastic changes after the ablation, and although unintended, there could be a delay in that diagnosis secondary to the ablation.   Finally patient is made aware that Ablation is not a form of contraception and that this procedure is not intended for contraception. Even so, it is not recommended that patient become pregnant after this procedure. Although pregnancies have been reported, there is an  increased risk of morbidity and fetal loss in those pregnancies.    Specific to Laparoscopic surgery , patient made aware of increased risk of trocoar injuries to the intestine, major /minor blood vessels and bladder.Patient is also made aware of the slight increase risk to those organs mention from thermal ( burn) injuries. Patient is aware that the small operating arena of this type surgery may make immediate diagnosis of these injuries difficult. Thus the patient acknowledges both immediate and delayed diagnosis of these  complications from this type of surgery are possible, albeit rare.  We discussed alternatives to sterilization including other forms of contraception and these were declined.                                                                                                       Specific to Abdominal surgery ( possible open), patient is made aware of risk of injury to the bowel, bladder, nerves and the ureter ( urine conduit). Complications to these organs are rare, but do occur and indeed it was discussed that the specific pathology of the patient that necessitated surgery may make the risk greater. Patient additionally is aware of the risk of subsequent adhesions or small bowel obstruction from open abdominal surgery.  After extensive discussion, patient states she desires to proceed with surgery as scheduled. Preoperative instructions were discussed. Postoperative care management and restrictions were reviewed. We discussed that she will need preoperative testing and will be contacted by the hospital. Informed consents were obtained today       Assessment & Plan        1. Encounter for sterilization education  30-year-old  0 here to discuss permanent sterilization. I discussed sterilization and alternatives to sterilization but patient desires to proceed with laparoscopic bilateral salpingectomy. I counseled patient on surgery including risks benefits and possible complications and  patient desires to proceed with laparoscopic bilateral salpingectomy. Preoperative instructions were discussed  - Consent for all Surgical, Special Diagnostic or Therapeutic Procedures  - SARS CoV-2 Ab, Total; Standing  - POCT Pregnancy  - SARS CoV-2 Ab, Total    2. Breakthrough bleeding on Nexplanon  Breakthrough bleeding has resolved after removal of Nexplanon. Patient reports normal menstrual cycles since Nexplanon removal. She desires endometrial ablation because she has had previous heavy menstruation. I counseled patient on procedure and patient agrees to procedure. Informed consents were obtained  - Consent for all Surgical, Special Diagnostic or Therapeutic Procedures  - SARS CoV-2 Ab, Total; Standing  - POCT Pregnancy  - SARS CoV-2 Ab, Total     3. Left ovarian cyst on ultrasound discussed. If cyst is still present at the time of surgery we will remove her drain the cyst laparoscopically as requested by patient    4. Precautions and plan of care were reviewed. Patient to follow-up 2-week postop

## 2021-08-18 ENCOUNTER — PRE-ADMISSION TESTING (OUTPATIENT)
Dept: ADMISSIONS | Facility: MEDICAL CENTER | Age: 31
End: 2021-08-18
Attending: OBSTETRICS & GYNECOLOGY
Payer: COMMERCIAL

## 2021-08-18 ENCOUNTER — TELEPHONE (OUTPATIENT)
Dept: MEDICAL GROUP | Facility: MEDICAL CENTER | Age: 31
End: 2021-08-18

## 2021-08-18 DIAGNOSIS — G56.03 BILATERAL CARPAL TUNNEL SYNDROME: ICD-10-CM

## 2021-08-18 DIAGNOSIS — Z01.812 PRE-OPERATIVE LABORATORY EXAMINATION: ICD-10-CM

## 2021-08-18 LAB
ANION GAP SERPL CALC-SCNC: 12 MMOL/L (ref 7–16)
BUN SERPL-MCNC: 10 MG/DL (ref 8–22)
CALCIUM SERPL-MCNC: 9.3 MG/DL (ref 8.5–10.5)
CHLORIDE SERPL-SCNC: 105 MMOL/L (ref 96–112)
CO2 SERPL-SCNC: 21 MMOL/L (ref 20–33)
CREAT SERPL-MCNC: 0.53 MG/DL (ref 0.5–1.4)
ERYTHROCYTE [DISTWIDTH] IN BLOOD BY AUTOMATED COUNT: 47.1 FL (ref 35.9–50)
GLUCOSE SERPL-MCNC: 100 MG/DL (ref 65–99)
HCT VFR BLD AUTO: 43.1 % (ref 37–47)
HGB BLD-MCNC: 14.1 G/DL (ref 12–16)
MCH RBC QN AUTO: 27.9 PG (ref 27–33)
MCHC RBC AUTO-ENTMCNC: 32.7 G/DL (ref 33.6–35)
MCV RBC AUTO: 85.2 FL (ref 81.4–97.8)
PLATELET # BLD AUTO: 300 K/UL (ref 164–446)
PMV BLD AUTO: 10.8 FL (ref 9–12.9)
POTASSIUM SERPL-SCNC: 4.7 MMOL/L (ref 3.6–5.5)
RBC # BLD AUTO: 5.06 M/UL (ref 4.2–5.4)
SODIUM SERPL-SCNC: 138 MMOL/L (ref 135–145)
WBC # BLD AUTO: 7.3 K/UL (ref 4.8–10.8)

## 2021-08-18 PROCEDURE — 36415 COLL VENOUS BLD VENIPUNCTURE: CPT

## 2021-08-18 PROCEDURE — 80048 BASIC METABOLIC PNL TOTAL CA: CPT

## 2021-08-18 PROCEDURE — 85027 COMPLETE CBC AUTOMATED: CPT

## 2021-08-18 NOTE — TELEPHONE ENCOUNTER
VOICEMAIL  1. Caller Name: Alpa                      Call Back Number: [phone]    2. Message: Patient called to request an addition to her existing nerve conduction study order. She reports the original order only includes upper extremities. Patient reports symptoms in her right leg as well and is wondering if the nerve conduction order can include right leg. She was informed that if order is input into system, they can perform those tests together. Please advise and order if appropriate.    3. Patient approves office to leave a detailed voicemail/Wanamakerhart message: YES

## 2021-08-18 NOTE — TELEPHONE ENCOUNTER
HPI:  Germán Love is a 26 y o  female here for her yearly health maintenance exam    Patient Active Problem List   Diagnosis    Current episode of major depressive disorder without prior episode    Anxiety     Past Medical History:   Diagnosis Date    Positive depression screening     resolved 7/18/17       1  Advanced Directive: n     2  Durable Power of  for Healthcare: n     3  Social History:           Drug and alcohol History: n                  4  Immunizations up to date: y                 Lifestyle:                           Healthy Diet:y                          Alcohol Use:y                          Tobacco Use:n                          Regular exercise:n                          Weight concerns:n                               5  Over the past 2 weeks, how often have you been bothered by the following:              Little interest or pleasure in doing things:n              Felling down, depressed or hopeless:n       Current Outpatient Medications   Medication Sig Dispense Refill    CAMRESE 0 15-0 03 &0 01 MG TABS Take 1 tablet by mouth daily  0    desvenlafaxine (PRISTIQ) 100 mg 24 hr tablet TAKE 1 TABLET BY MOUTH ONCE DAY   LORazepam (ATIVAN) 0 5 mg tablet Take 1 tablet (0 5 mg total) by mouth every 8 (eight) hours as needed for anxiety 60 tablet 1    triamcinolone (KENALOG) 0 5 % cream Apply topically 3 (three) times a day 60 g 1     No current facility-administered medications for this visit  No Known Allergies  Immunization History   Administered Date(s) Administered    Tdap 04/19/2012       Patient Care Team:  Arlin Hale MD as PCP - General    Review of Systems   Constitutional: Negative for fatigue, fever and unexpected weight change  HENT: Negative for congestion, sinus pain and sore throat  Eyes: Negative for visual disturbance  Respiratory: Negative for shortness of breath and wheezing  Cardiovascular: Negative for chest pain and palpitations  Patient requesting nerve conduction study to be completed in her upper and lower extremities.  This referral is placed for the patient.   Gastrointestinal: Negative for abdominal pain, nausea and vomiting  Musculoskeletal: Negative  Negative for arthralgias and myalgias  Neurological: Negative for syncope, weakness and numbness  Psychiatric/Behavioral: Negative  Negative for confusion, dysphoric mood and suicidal ideas  Physical Exam :  Physical Exam   Constitutional: She is oriented to person, place, and time  Vital signs are normal  She appears well-developed and well-nourished  HENT:   Right Ear: Ear canal normal  Tympanic membrane is not injected  Left Ear: Ear canal normal  Tympanic membrane is not injected  Nose: Nose normal    Mouth/Throat: Oropharynx is clear and moist    Eyes: Pupils are equal, round, and reactive to light  Conjunctivae and EOM are normal  Right eye exhibits no discharge  Left eye exhibits no discharge  Neck: Normal range of motion  Neck supple  No thyromegaly present  Cardiovascular: Normal rate, regular rhythm and normal heart sounds  No murmur heard  Pulmonary/Chest: Effort normal and breath sounds normal  No respiratory distress  She has no wheezes  Abdominal: Soft  Bowel sounds are normal  She exhibits no distension  There is no tenderness  Musculoskeletal: Normal range of motion  Lymphadenopathy:     She has no cervical adenopathy  Neurological: She is alert and oriented to person, place, and time  She has normal strength and normal reflexes  She is not disoriented  No sensory deficit  Gait normal    Skin: Skin is warm and dry  Psychiatric: She has a normal mood and affect  Her speech is normal and behavior is normal  Judgment and thought content normal  Cognition and memory are normal          Assessment and Plan:  1   Nausea  Celiac Disease Comprehensive Panel    Comprehensive metabolic panel    CBC and differential    TSH, 3rd generation with Free T4 reflex    Food Allergy Profile    Celiac Disease Comprehensive Panel    Comprehensive metabolic panel    CBC and differential TSH, 3rd generation with Free T4 reflex   2  Fatigue, unspecified type  Celiac Disease Comprehensive Panel    Comprehensive metabolic panel    CBC and differential    TSH, 3rd generation with Free T4 reflex    Food Allergy Profile    Celiac Disease Comprehensive Panel    Comprehensive metabolic panel    CBC and differential    TSH, 3rd generation with Free T4 reflex   3   Wellness examination         Health Maintenance Due   Topic Date Due    HIV Screening  04/03/2002    Cervical Cancer Screening  04/03/2008    Influenza Vaccine  07/01/2019    Annual Physical  03/07/2020

## 2021-08-18 NOTE — TELEPHONE ENCOUNTER
Please let the patient know I went ahead and ordered the nerve conduction study be a referral to physiatry.  She will have to wait the 2 weeks in order to get a call back from the referral department to make that appointment.  If her numbness and tingling continues or continues to worsen like it has I recommend that she get an MRI scan of her spine.      CHARI Treadwell.

## 2021-08-20 ENCOUNTER — APPOINTMENT (RX ONLY)
Dept: URBAN - METROPOLITAN AREA CLINIC 4 | Facility: CLINIC | Age: 31
Setting detail: DERMATOLOGY
End: 2021-08-20

## 2021-08-20 DIAGNOSIS — L82.1 OTHER SEBORRHEIC KERATOSIS: ICD-10-CM

## 2021-08-20 DIAGNOSIS — L81.4 OTHER MELANIN HYPERPIGMENTATION: ICD-10-CM

## 2021-08-20 DIAGNOSIS — D18.0 HEMANGIOMA: ICD-10-CM

## 2021-08-20 DIAGNOSIS — D22 MELANOCYTIC NEVI: ICD-10-CM

## 2021-08-20 DIAGNOSIS — Z71.89 OTHER SPECIFIED COUNSELING: ICD-10-CM

## 2021-08-20 DIAGNOSIS — L73.8 OTHER SPECIFIED FOLLICULAR DISORDERS: ICD-10-CM

## 2021-08-20 DIAGNOSIS — D485 NEOPLASM OF UNCERTAIN BEHAVIOR OF SKIN: ICD-10-CM

## 2021-08-20 PROBLEM — D48.5 NEOPLASM OF UNCERTAIN BEHAVIOR OF SKIN: Status: ACTIVE | Noted: 2021-08-20

## 2021-08-20 PROBLEM — D22.5 MELANOCYTIC NEVI OF TRUNK: Status: ACTIVE | Noted: 2021-08-20

## 2021-08-20 PROBLEM — D18.01 HEMANGIOMA OF SKIN AND SUBCUTANEOUS TISSUE: Status: ACTIVE | Noted: 2021-08-20

## 2021-08-20 PROCEDURE — ? COUNSELING

## 2021-08-20 PROCEDURE — ? SUNSCREEN TREATMENT REGIMEN

## 2021-08-20 PROCEDURE — 11102 TANGNTL BX SKIN SINGLE LES: CPT

## 2021-08-20 PROCEDURE — 99203 OFFICE O/P NEW LOW 30 MIN: CPT | Mod: 25

## 2021-08-20 PROCEDURE — ? BIOPSY BY SHAVE METHOD

## 2021-08-20 ASSESSMENT — LOCATION DETAILED DESCRIPTION DERM
LOCATION DETAILED: INFERIOR MID FOREHEAD
LOCATION DETAILED: LEFT MEDIAL UPPER BACK
LOCATION DETAILED: RIGHT SUPERIOR CENTRAL MALAR CHEEK
LOCATION DETAILED: RIGHT INFERIOR LATERAL NECK
LOCATION DETAILED: SUPERIOR MID FOREHEAD
LOCATION DETAILED: RIGHT INFERIOR LATERAL UPPER BACK
LOCATION DETAILED: RIGHT RADIAL DORSAL HAND
LOCATION DETAILED: LEFT ULNAR DORSAL HAND
LOCATION DETAILED: EPIGASTRIC SKIN
LOCATION DETAILED: SUPERIOR THORACIC SPINE

## 2021-08-20 ASSESSMENT — LOCATION SIMPLE DESCRIPTION DERM
LOCATION SIMPLE: ABDOMEN
LOCATION SIMPLE: RIGHT CHEEK
LOCATION SIMPLE: RIGHT UPPER BACK
LOCATION SIMPLE: LEFT UPPER BACK
LOCATION SIMPLE: UPPER BACK
LOCATION SIMPLE: NECK
LOCATION SIMPLE: RIGHT HAND
LOCATION SIMPLE: INFERIOR FOREHEAD
LOCATION SIMPLE: SUPERIOR FOREHEAD
LOCATION SIMPLE: LEFT HAND

## 2021-08-20 ASSESSMENT — LOCATION ZONE DERM
LOCATION ZONE: TRUNK
LOCATION ZONE: HAND
LOCATION ZONE: FACE
LOCATION ZONE: NECK

## 2021-08-30 ENCOUNTER — ANESTHESIA EVENT (OUTPATIENT)
Dept: SURGERY | Facility: MEDICAL CENTER | Age: 31
End: 2021-08-30
Payer: COMMERCIAL

## 2021-08-30 ENCOUNTER — HOSPITAL ENCOUNTER (OUTPATIENT)
Facility: MEDICAL CENTER | Age: 31
End: 2021-08-30
Attending: OBSTETRICS & GYNECOLOGY | Admitting: OBSTETRICS & GYNECOLOGY
Payer: COMMERCIAL

## 2021-08-30 ENCOUNTER — ANESTHESIA (OUTPATIENT)
Dept: SURGERY | Facility: MEDICAL CENTER | Age: 31
End: 2021-08-30
Payer: COMMERCIAL

## 2021-08-30 VITALS
BODY MASS INDEX: 35.2 KG/M2 | HEART RATE: 79 BPM | TEMPERATURE: 97.4 F | OXYGEN SATURATION: 98 % | RESPIRATION RATE: 20 BRPM | SYSTOLIC BLOOD PRESSURE: 111 MMHG | WEIGHT: 198.63 LBS | HEIGHT: 63 IN | DIASTOLIC BLOOD PRESSURE: 65 MMHG

## 2021-08-30 DIAGNOSIS — Z98.890 S/P LAPAROSCOPY: ICD-10-CM

## 2021-08-30 PROBLEM — Z90.79 STATUS POST BILATERAL SALPINGECTOMY: Status: ACTIVE | Noted: 2021-08-30

## 2021-08-30 LAB
HCG SERPL QL: NEGATIVE
PATHOLOGY CONSULT NOTE: NORMAL

## 2021-08-30 PROCEDURE — 160048 HCHG OR STATISTICAL LEVEL 1-5: Performed by: OBSTETRICS & GYNECOLOGY

## 2021-08-30 PROCEDURE — 160041 HCHG SURGERY MINUTES - EA ADDL 1 MIN LEVEL 4: Performed by: OBSTETRICS & GYNECOLOGY

## 2021-08-30 PROCEDURE — 700101 HCHG RX REV CODE 250: Performed by: OBSTETRICS & GYNECOLOGY

## 2021-08-30 PROCEDURE — 160002 HCHG RECOVERY MINUTES (STAT): Performed by: OBSTETRICS & GYNECOLOGY

## 2021-08-30 PROCEDURE — 88302 TISSUE EXAM BY PATHOLOGIST: CPT

## 2021-08-30 PROCEDURE — 49322 LAPAROSCOPY ASPIRATION: CPT | Mod: 59 | Performed by: OBSTETRICS & GYNECOLOGY

## 2021-08-30 PROCEDURE — 84703 CHORIONIC GONADOTROPIN ASSAY: CPT

## 2021-08-30 PROCEDURE — 500868 HCHG NEEDLE, SURGI(VARES): Performed by: OBSTETRICS & GYNECOLOGY

## 2021-08-30 PROCEDURE — 160025 RECOVERY II MINUTES (STATS): Performed by: OBSTETRICS & GYNECOLOGY

## 2021-08-30 PROCEDURE — A9270 NON-COVERED ITEM OR SERVICE: HCPCS | Performed by: ANESTHESIOLOGY

## 2021-08-30 PROCEDURE — 501582 HCHG TROCAR, THRD BLADED: Performed by: OBSTETRICS & GYNECOLOGY

## 2021-08-30 PROCEDURE — 501838 HCHG SUTURE GENERAL: Performed by: OBSTETRICS & GYNECOLOGY

## 2021-08-30 PROCEDURE — 501330 HCHG SET, CYSTO IRRIG TUBING: Performed by: OBSTETRICS & GYNECOLOGY

## 2021-08-30 PROCEDURE — 160035 HCHG PACU - 1ST 60 MINS PHASE I: Performed by: OBSTETRICS & GYNECOLOGY

## 2021-08-30 PROCEDURE — 501581 HCHG TROCAR: Performed by: OBSTETRICS & GYNECOLOGY

## 2021-08-30 PROCEDURE — 160047 HCHG PACU  - EA ADDL 30 MINS PHASE II: Performed by: OBSTETRICS & GYNECOLOGY

## 2021-08-30 PROCEDURE — 502587 HCHG PACK, D&C: Performed by: OBSTETRICS & GYNECOLOGY

## 2021-08-30 PROCEDURE — 700101 HCHG RX REV CODE 250: Performed by: ANESTHESIOLOGY

## 2021-08-30 PROCEDURE — 58661 LAPAROSCOPY REMOVE ADNEXA: CPT | Mod: 80 | Performed by: OBSTETRICS & GYNECOLOGY

## 2021-08-30 PROCEDURE — 58661 LAPAROSCOPY REMOVE ADNEXA: CPT | Performed by: OBSTETRICS & GYNECOLOGY

## 2021-08-30 PROCEDURE — 700102 HCHG RX REV CODE 250 W/ 637 OVERRIDE(OP): Performed by: ANESTHESIOLOGY

## 2021-08-30 PROCEDURE — 700111 HCHG RX REV CODE 636 W/ 250 OVERRIDE (IP): Performed by: ANESTHESIOLOGY

## 2021-08-30 PROCEDURE — 160009 HCHG ANES TIME/MIN: Performed by: OBSTETRICS & GYNECOLOGY

## 2021-08-30 PROCEDURE — 49322 LAPAROSCOPY ASPIRATION: CPT | Mod: 80,59 | Performed by: OBSTETRICS & GYNECOLOGY

## 2021-08-30 PROCEDURE — 160046 HCHG PACU - 1ST 60 MINS PHASE II: Performed by: OBSTETRICS & GYNECOLOGY

## 2021-08-30 PROCEDURE — 500886 HCHG PACK, LAPAROSCOPY: Performed by: OBSTETRICS & GYNECOLOGY

## 2021-08-30 PROCEDURE — 502703 HCHG DEVICE, LIGASURE V SEALER: Performed by: OBSTETRICS & GYNECOLOGY

## 2021-08-30 PROCEDURE — 160029 HCHG SURGERY MINUTES - 1ST 30 MINS LEVEL 4: Performed by: OBSTETRICS & GYNECOLOGY

## 2021-08-30 PROCEDURE — 700105 HCHG RX REV CODE 258: Performed by: OBSTETRICS & GYNECOLOGY

## 2021-08-30 PROCEDURE — 58563 HYSTEROSCOPY ABLATION: CPT | Mod: 80 | Performed by: OBSTETRICS & GYNECOLOGY

## 2021-08-30 PROCEDURE — 500002 HCHG ADHESIVE, DERMABOND: Performed by: OBSTETRICS & GYNECOLOGY

## 2021-08-30 PROCEDURE — 88305 TISSUE EXAM BY PATHOLOGIST: CPT

## 2021-08-30 PROCEDURE — 58563 HYSTEROSCOPY ABLATION: CPT | Performed by: OBSTETRICS & GYNECOLOGY

## 2021-08-30 RX ORDER — LIDOCAINE HYDROCHLORIDE 20 MG/ML
INJECTION, SOLUTION EPIDURAL; INFILTRATION; INTRACAUDAL; PERINEURAL PRN
Status: DISCONTINUED | OUTPATIENT
Start: 2021-08-30 | End: 2021-08-30 | Stop reason: SURG

## 2021-08-30 RX ORDER — DEXAMETHASONE SODIUM PHOSPHATE 4 MG/ML
INJECTION, SOLUTION INTRA-ARTICULAR; INTRALESIONAL; INTRAMUSCULAR; INTRAVENOUS; SOFT TISSUE PRN
Status: DISCONTINUED | OUTPATIENT
Start: 2021-08-30 | End: 2021-08-30 | Stop reason: SURG

## 2021-08-30 RX ORDER — OXYCODONE HCL 5 MG/5 ML
10 SOLUTION, ORAL ORAL
Status: COMPLETED | OUTPATIENT
Start: 2021-08-30 | End: 2021-08-30

## 2021-08-30 RX ORDER — MEPERIDINE HYDROCHLORIDE 25 MG/ML
25 INJECTION INTRAMUSCULAR; INTRAVENOUS; SUBCUTANEOUS
Status: DISCONTINUED | OUTPATIENT
Start: 2021-08-30 | End: 2021-08-30 | Stop reason: HOSPADM

## 2021-08-30 RX ORDER — SODIUM CHLORIDE, SODIUM LACTATE, POTASSIUM CHLORIDE, CALCIUM CHLORIDE 600; 310; 30; 20 MG/100ML; MG/100ML; MG/100ML; MG/100ML
INJECTION, SOLUTION INTRAVENOUS CONTINUOUS
Status: ACTIVE | OUTPATIENT
Start: 2021-08-30 | End: 2021-08-30

## 2021-08-30 RX ORDER — MIDAZOLAM HYDROCHLORIDE 1 MG/ML
1 INJECTION INTRAMUSCULAR; INTRAVENOUS
Status: DISCONTINUED | OUTPATIENT
Start: 2021-08-30 | End: 2021-08-30 | Stop reason: HOSPADM

## 2021-08-30 RX ORDER — HYDROCODONE BITARTRATE AND ACETAMINOPHEN 5; 325 MG/1; MG/1
1 TABLET ORAL EVERY 4 HOURS PRN
Qty: 20 TABLET | Refills: 0 | Status: SHIPPED | OUTPATIENT
Start: 2021-08-30 | End: 2021-09-04

## 2021-08-30 RX ORDER — ONDANSETRON 2 MG/ML
INJECTION INTRAMUSCULAR; INTRAVENOUS PRN
Status: DISCONTINUED | OUTPATIENT
Start: 2021-08-30 | End: 2021-08-30 | Stop reason: SURG

## 2021-08-30 RX ORDER — SODIUM CHLORIDE, SODIUM LACTATE, POTASSIUM CHLORIDE, CALCIUM CHLORIDE 600; 310; 30; 20 MG/100ML; MG/100ML; MG/100ML; MG/100ML
INJECTION, SOLUTION INTRAVENOUS CONTINUOUS
Status: DISCONTINUED | OUTPATIENT
Start: 2021-08-30 | End: 2021-08-30 | Stop reason: HOSPADM

## 2021-08-30 RX ORDER — BUPIVACAINE HYDROCHLORIDE AND EPINEPHRINE 2.5; 5 MG/ML; UG/ML
INJECTION, SOLUTION EPIDURAL; INFILTRATION; INTRACAUDAL; PERINEURAL
Status: DISCONTINUED | OUTPATIENT
Start: 2021-08-30 | End: 2021-08-30 | Stop reason: HOSPADM

## 2021-08-30 RX ORDER — OXYCODONE HCL 5 MG/5 ML
5 SOLUTION, ORAL ORAL
Status: COMPLETED | OUTPATIENT
Start: 2021-08-30 | End: 2021-08-30

## 2021-08-30 RX ORDER — ONDANSETRON 2 MG/ML
4 INJECTION INTRAMUSCULAR; INTRAVENOUS
Status: COMPLETED | OUTPATIENT
Start: 2021-08-30 | End: 2021-08-30

## 2021-08-30 RX ORDER — HYDROMORPHONE HYDROCHLORIDE 1 MG/ML
0.2 INJECTION, SOLUTION INTRAMUSCULAR; INTRAVENOUS; SUBCUTANEOUS
Status: DISCONTINUED | OUTPATIENT
Start: 2021-08-30 | End: 2021-08-30 | Stop reason: HOSPADM

## 2021-08-30 RX ORDER — IPRATROPIUM BROMIDE AND ALBUTEROL SULFATE 2.5; .5 MG/3ML; MG/3ML
3 SOLUTION RESPIRATORY (INHALATION)
Status: DISCONTINUED | OUTPATIENT
Start: 2021-08-30 | End: 2021-08-30 | Stop reason: HOSPADM

## 2021-08-30 RX ORDER — HYDROMORPHONE HYDROCHLORIDE 1 MG/ML
0.1 INJECTION, SOLUTION INTRAMUSCULAR; INTRAVENOUS; SUBCUTANEOUS
Status: DISCONTINUED | OUTPATIENT
Start: 2021-08-30 | End: 2021-08-30 | Stop reason: HOSPADM

## 2021-08-30 RX ORDER — ONDANSETRON 4 MG/1
4 TABLET, FILM COATED ORAL EVERY 6 HOURS PRN
Qty: 8 EACH | Refills: 0 | Status: SHIPPED | OUTPATIENT
Start: 2021-08-30 | End: 2021-09-04

## 2021-08-30 RX ORDER — DIPHENHYDRAMINE HYDROCHLORIDE 50 MG/ML
12.5 INJECTION INTRAMUSCULAR; INTRAVENOUS
Status: DISCONTINUED | OUTPATIENT
Start: 2021-08-30 | End: 2021-08-30 | Stop reason: HOSPADM

## 2021-08-30 RX ORDER — HYDROMORPHONE HYDROCHLORIDE 1 MG/ML
0.5 INJECTION, SOLUTION INTRAMUSCULAR; INTRAVENOUS; SUBCUTANEOUS
Status: DISCONTINUED | OUTPATIENT
Start: 2021-08-30 | End: 2021-08-30 | Stop reason: HOSPADM

## 2021-08-30 RX ORDER — SIMETHICONE 80 MG
80 TABLET,CHEWABLE ORAL EVERY 8 HOURS PRN
Status: DISCONTINUED | OUTPATIENT
Start: 2021-08-30 | End: 2021-08-30 | Stop reason: HOSPADM

## 2021-08-30 RX ORDER — CEFAZOLIN SODIUM 1 G/3ML
INJECTION, POWDER, FOR SOLUTION INTRAMUSCULAR; INTRAVENOUS PRN
Status: DISCONTINUED | OUTPATIENT
Start: 2021-08-30 | End: 2021-08-30 | Stop reason: SURG

## 2021-08-30 RX ORDER — DOCUSATE SODIUM 100 MG/1
100 CAPSULE, LIQUID FILLED ORAL 2 TIMES DAILY
Qty: 20 CAPSULE | Refills: 1 | Status: SHIPPED | OUTPATIENT
Start: 2021-08-30 | End: 2022-01-04

## 2021-08-30 RX ORDER — SODIUM CHLORIDE, SODIUM LACTATE, POTASSIUM CHLORIDE, CALCIUM CHLORIDE 600; 310; 30; 20 MG/100ML; MG/100ML; MG/100ML; MG/100ML
INJECTION, SOLUTION INTRAVENOUS CONTINUOUS
Status: CANCELLED | OUTPATIENT
Start: 2021-08-30 | End: 2021-08-30

## 2021-08-30 RX ORDER — KETOROLAC TROMETHAMINE 30 MG/ML
INJECTION, SOLUTION INTRAMUSCULAR; INTRAVENOUS PRN
Status: DISCONTINUED | OUTPATIENT
Start: 2021-08-30 | End: 2021-08-30 | Stop reason: SURG

## 2021-08-30 RX ORDER — IBUPROFEN 600 MG/1
600 TABLET ORAL EVERY 6 HOURS PRN
Qty: 30 TABLET | Refills: 0 | Status: SHIPPED | OUTPATIENT
Start: 2021-08-30 | End: 2022-01-04

## 2021-08-30 RX ADMIN — PROPOFOL 200 MG: 10 INJECTION, EMULSION INTRAVENOUS at 11:34

## 2021-08-30 RX ADMIN — CEFAZOLIN 2 G: 330 INJECTION, POWDER, FOR SOLUTION INTRAMUSCULAR; INTRAVENOUS at 11:29

## 2021-08-30 RX ADMIN — DEXAMETHASONE SODIUM PHOSPHATE 8 MG: 4 INJECTION, SOLUTION INTRA-ARTICULAR; INTRALESIONAL; INTRAMUSCULAR; INTRAVENOUS; SOFT TISSUE at 11:50

## 2021-08-30 RX ADMIN — ONDANSETRON 4 MG: 2 INJECTION INTRAMUSCULAR; INTRAVENOUS at 11:50

## 2021-08-30 RX ADMIN — FENTANYL CITRATE 100 MCG: 50 INJECTION, SOLUTION INTRAMUSCULAR; INTRAVENOUS at 11:34

## 2021-08-30 RX ADMIN — MEPERIDINE HYDROCHLORIDE 25 MG: 25 INJECTION INTRAMUSCULAR; INTRAVENOUS; SUBCUTANEOUS at 13:53

## 2021-08-30 RX ADMIN — LIDOCAINE HYDROCHLORIDE 80 MG: 20 INJECTION, SOLUTION EPIDURAL; INFILTRATION; INTRACAUDAL at 11:34

## 2021-08-30 RX ADMIN — KETOROLAC TROMETHAMINE 30 MG: 30 INJECTION, SOLUTION INTRAMUSCULAR at 12:37

## 2021-08-30 RX ADMIN — FENTANYL CITRATE 50 MCG: 50 INJECTION, SOLUTION INTRAMUSCULAR; INTRAVENOUS at 14:05

## 2021-08-30 RX ADMIN — ONDANSETRON 4 MG: 2 INJECTION INTRAMUSCULAR; INTRAVENOUS at 15:54

## 2021-08-30 RX ADMIN — FENTANYL CITRATE 50 MCG: 50 INJECTION, SOLUTION INTRAMUSCULAR; INTRAVENOUS at 14:44

## 2021-08-30 RX ADMIN — FENTANYL CITRATE 50 MCG: 50 INJECTION, SOLUTION INTRAMUSCULAR; INTRAVENOUS at 13:01

## 2021-08-30 RX ADMIN — SODIUM CHLORIDE, POTASSIUM CHLORIDE, SODIUM LACTATE AND CALCIUM CHLORIDE: 600; 310; 30; 20 INJECTION, SOLUTION INTRAVENOUS at 10:08

## 2021-08-30 RX ADMIN — MIDAZOLAM 2 MG: 1 INJECTION INTRAMUSCULAR; INTRAVENOUS at 11:29

## 2021-08-30 RX ADMIN — SUGAMMADEX 180 MG: 100 INJECTION, SOLUTION INTRAVENOUS at 13:22

## 2021-08-30 RX ADMIN — ROCURONIUM BROMIDE 30 MG: 10 INJECTION, SOLUTION INTRAVENOUS at 12:23

## 2021-08-30 RX ADMIN — FENTANYL CITRATE 50 MCG: 50 INJECTION, SOLUTION INTRAMUSCULAR; INTRAVENOUS at 13:48

## 2021-08-30 RX ADMIN — OXYCODONE HYDROCHLORIDE 10 MG: 5 SOLUTION ORAL at 13:46

## 2021-08-30 RX ADMIN — ROCURONIUM BROMIDE 50 MG: 10 INJECTION, SOLUTION INTRAVENOUS at 11:34

## 2021-08-30 RX ADMIN — FENTANYL CITRATE 50 MCG: 50 INJECTION, SOLUTION INTRAMUSCULAR; INTRAVENOUS at 12:06

## 2021-08-30 ASSESSMENT — PAIN DESCRIPTION - PAIN TYPE: TYPE: SURGICAL PAIN

## 2021-08-30 ASSESSMENT — FIBROSIS 4 INDEX: FIB4 SCORE: 0.35

## 2021-08-30 ASSESSMENT — PAIN SCALES - GENERAL: PAIN_LEVEL: 3

## 2021-08-30 NOTE — DISCHARGE INSTRUCTIONS
ACTIVITY: Rest and take it easy for the first 24 hours.  A responsible adult is recommended to remain with you during that time.  It is normal to feel sleepy.  We encourage you to not do anything that requires balance, judgment or coordination.    MILD FLU-LIKE SYMPTOMS ARE NORMAL. YOU MAY EXPERIENCE GENERALIZED MUSCLE ACHES, THROAT IRRITATION, HEADACHE AND/OR SOME NAUSEA.    FOR 24 HOURS DO NOT:  Drive, operate machinery or run household appliances.  Drink beer or alcoholic beverages.   Make important decisions or sign legal documents.    SPECIAL INSTRUCTIONS:     DIET: To avoid nausea, slowly advance diet as tolerated, avoiding spicy or greasy foods for the first day.  Add more substantial food to your diet according to your physician's instructions.  Babies can be fed formula or breast milk as soon as they are hungry.  INCREASE FLUIDS AND FIBER TO AVOID CONSTIPATION.    SURGICAL DRESSING/BATHING:     SEE ATTACHED: Post Operative Pelviscopy Instructions  Change mabel pad as nedeed.      FOLLOW-UP APPOINTMENT:  A follow-up appointment should be arranged with your doctor ; call to schedule.    You should CALL YOUR PHYSICIAN if you develop:  Fever greater than 101 degrees F.  Pain not relieved by medication, or persistent nausea or vomiting.  Excessive bleeding (blood soaking through dressing) or unexpected drainage from the wound.  Extreme redness or swelling around the incision site, drainage of pus or foul smelling drainage.  Inability to urinate or empty your bladder within 8 hours.  Problems with breathing or chest pain.    You should call 911 if you develop problems with breathing or chest pain.  If you are unable to contact your doctor or surgical center, you should go to the nearest emergency room or urgent care center.      Physician's telephone #: [phone]    If any questions arise, call your doctor.  If your doctor is not available, please feel free to call the Surgical Center at [phone]. The  Contact Center is open Monday through Friday 7AM to 5PM and may speak to a nurse at [phone], or toll free at [phone].     A registered nurse may call you a few days after your surgery to see how you are doing after your procedure.    MEDICATIONS: Resume taking daily medication.  Take prescribed pain medication with food.  If no medication is prescribed, you may take non-aspirin pain medication if needed.  PAIN MEDICATION CAN BE VERY CONSTIPATING.  Take a stool softener or laxative such as senokot, pericolace, or milk of magnesia if needed.    Prescription given for DOCUSATE, NORCO, IBUPROFEN.  Last pain medication given at  1: 46 pm.    If your physician has prescribed pain medication that includes Acetaminophen (Tylenol), do not take additional Acetaminophen (Tylenol) while taking the prescribed medication.    Depression / Suicide Risk    As you are discharged from this [facility] facility, it is important to learn how to keep safe from harming yourself.    Recognize the warning signs:  · Abrupt changes in personality, positive or negative- including increase in energy   · Giving away possessions  · Change in eating patterns- significant weight changes-  positive or negative  · Change in sleeping patterns- unable to sleep or sleeping all the time   · Unwillingness or inability to communicate  · Depression  · Unusual sadness, discouragement and loneliness  · Talk of wanting to die  · Neglect of personal appearance   · Rebelliousness- reckless behavior  · Withdrawal from people/activities they love  · Confusion- inability to concentrate     If you or a loved one observes any of these behaviors or has concerns about self-harm, here's what you can do:  · Talk about it- your feelings and reasons for harming yourself  · Remove any means that you might use to hurt yourself (examples: pills, rope, extension cords, firearm)  · Get professional help from the community (Mental Health, Substance Abuse,  psychological counseling)  · Do not be alone:Call your Safe Contact- someone whom you trust who will be there for you.  · Call your local CRISIS HOTLINE 596-7597 or 255-276-2410  · Call your local Children's Mobile Crisis Response Team [place] (199) 718-5683 or www.Tungle.me  · Call the toll free National Suicide Prevention Hotlines   · National Suicide Prevention Lifeline 948-275-QVPI (1932)  · National Hope Line Network 800-SUICIDE (510-2631)

## 2021-08-30 NOTE — OR NURSING
1335: Patient from OR awake via gurney to PACU. 4 L via mask. Abdominal lap sites (x3) soft and dermabond intact.  Peripad in place. Report from the anesthesiologist and RN received.     1350: Patient reporting pain and shivering. Will medicate. SEE MAR. Patient tolerated sips of water. Ice pack to abdomen.     1410: Shivering resolved.     1415: Discharge instructions discussed with . All questions answered and verbalizes understanding.    1505: Patient to the bathroom to void.     1530: Patient wasn't able to void. IV fluids infusing.    1556: MD at bedside.  Bladder scan : unsuccessful.     1600: Dr Edison snider cath the patient at bedside (100 ml). Patient OK for discharge per surgeon.     1615: DC instructions given. Questions answered. Abdominal lap sites (x3) soft,CDI. No c/o pain or nausea. Patient wide awake. VSS. Discussed diet, activity, medications, follow up care and worsening symptoms. Patient met criteria for discharge.

## 2021-08-30 NOTE — ANESTHESIA PROCEDURE NOTES
Airway    Date/Time: 8/30/2021 11:35 AM  Performed by: Adam Heller M.D.  Authorized by: Adam Heller M.D.     Location:  OR  Urgency:  Elective  Indications for Airway Management:  Anesthesia      Spontaneous Ventilation: absent    Sedation Level:  Deep  Preoxygenated: Yes    Patient Position:  Sniffing  Final Airway Type:  Endotracheal airway  Final Endotracheal Airway:  ETT  Cuffed: Yes    Technique Used for Successful ETT Placement:  Direct laryngoscopy    Insertion Site:  Oral  Blade Type:  Scot  Laryngoscope Blade/Videolaryngoscope Blade Size:  3  ETT Size (mm):  7.0  Measured from:  Teeth  ETT to Teeth (cm):  21  Placement Verified by: auscultation and capnometry    Cormack-Lehane Classification:  Grade I - full view of glottis  Number of Attempts at Approach:  1

## 2021-08-30 NOTE — OP REPORT
DATE OF SERVICE:  08/30/2021     PREOPERATIVE DIAGNOSES:    1.  Encounter for sterilization.  2.  History of menorrhagia- Desires endometrial ablation  3.  Simple left ovarian cyst.     POSTOPERATIVE DIAGNOSES:    1.  Encounter for sterilization.  2.  History of menorrhagia-Desires endometrial ablation  3.  Simple left ovarian cyst.     PROCEDURES:    1.  Laparoscopic bilateral salpingectomy.  2.  Hydrothermal endometrial ablation (Genesys HTA)  3.  Drainage of left ovarian cyst.  4.  Endometrial biopsy/curettings.     SURGEON:  Pramod Hogan MD     ASSISTANT:  Ruthann Lake DO     ANESTHESIA:  General.     ANESTHESIOLOGIST:  Adam Heller MD     COMPLICATIONS:  None apparent.     SPECIMEN:  Endometrial curettings and bilateral fallopian tubes.     ESTIMATED BLOOD LOSS:  25 mL     URINE OUTPUT:  150 mL.     IV FLUID:  Two liters of lactated Ringer's.     FINDINGS:  On hysteroscopy, there were no endometrial masses noted.  Bilateral   tubal ostia were visualized.  NovaSure endometrial ablation was attempted, but cavity   width was 2 cm and as a result, the procedure could not be performed and   hydrothermal ablation was performed using Genesys HTA.  Ablation time was 10 minutes.  Uterus   was sounded to 7.5 cm on exam.  On laparoscopy, there was normal uterus with normal   fallopian tubes bilaterally.  There was normal right ovary with left ovary   containing a large simple ovarian cyst.     DESCRIPTION OF PROCEDURE:  After informed consents were obtained, the patient   was taken to the operating room and placed in dorsal supine position.  General   endotracheal anesthesia was administered.  The patient was prepped and draped   in normal sterile fashion in dorsal lithotomy position in [facility].    Preoperative antibiotics were given and a formal time-out was called.  Lindsey   catheter was placed.  A bivalve speculum was placed in the vagina and the   anterior lip of the cervix was grasped with a single  tooth tenaculum.    Cervical length was 3 cm and uterine cavity was sounded to 4.5 cm.  NovaSure   endometrial ablation device was set up in normal fashion, but cavity width was   2 cm and as a result, NovaSure endometrial ablation was not able to be   performed.  A hydrothermal hysteroscopic ablation was set up ( [employer]) and the   endometrial cavity was ablated in a normal fashion for 10 minutes under   visualization.  After the cooling phase was finished, the hysteroscopic device   was removed from the uterus and there was uniform blanching of the   endometrium noted throughout the procedure.  Tenaculum was released from the   cervix after hemostasis was noted. A sponge stick was placed in the vagina as   a means for uterine manipulation.     Attention was then turned to the abdomen where Marcaine with Epinepherine was infiltrated   infraumbilically.  A small transverse infraumbilical incision was made   following the prior scar and a Veress needle was placed while tenting the   anterior abdominal wall.  Intra-abdominal placement was confirmed with H2O   syringe test.  Pneumoperitoneum was obtained with CO2 gas with opening   pressure of 7 mmHg and pneumoperitoneum maintained at 15 mmHg.  After   insufflation of 3 liters of CO2 gas, a 5 mm Optiview trocar was placed under   visualization and intraabdominal placement was confirmed with laparoscope.    The patient was then placed in Trendelenburg position and Marcaine with   epinephrine was infiltrated in bilateral lower abdomen.  Two 5 mm incisions   were made and 5 mm trocars were placed in the bilateral lower abdomen,   avoiding the inferior epigastric vessel under direct visualization.  The   patient was placed further in Trendelenburg.  Survey of the abdomen and pelvis   was carried out with the above noted findings.  The right fallopian tube was   identified down to the fimbriated end.  The tube was grasped, transected, and   removed with handheld  LigaSure device with hemostasis noted.  In a similar   fashion, the left fallopian tube was identified, transected and removed   completely with hemostasis noted.  The left ovary was grasped and stabilized   and an incision was made into the left simple ovarian cyst and the cyst was   drained of some clear fluid.  The cyst edges were cauterized with the LigaSure   device with hemostasis noted.  Josh was placed in the cyst bed after   hemostasis was noted. Survey of the abdomen and pelvis reveals good   hemostasis.  Pneumoperitoneum was reversed and trocars were removed.    Laparoscopic sites were closed in a subcuticular fashion with 4-0 Monocryl   followed by Dermabond.     Attention was then turned to the vagina where the sponge stick was removed.    [place] speculum was placed in the vagina and hemostasis was noted in the   cervix.  Speculum was then removed.  The patient was taken out of lithotomy   position, extubated and taken to the recovery room in stable fashion.  All   sponge, lap, needle and instrument counts were correct x3.        ______________________________  Pramod Hogan MD RN/IRAM    DD:  08/30/2021 13:51  DT:  08/30/2021 14:31    Job#:  [id number]

## 2021-08-30 NOTE — ANESTHESIA TIME REPORT
Anesthesia Start and Stop Event Times     Date Time Event    8/30/2021 1013 Ready for Procedure     1129 Anesthesia Start     1343 Anesthesia Stop        Responsible Staff  08/30/21    Name Role Begin End    Adam Heller M.D. Anesth 1129 1343        Preop Diagnosis (Free Text):  Pre-op Diagnosis     PERMANENT STERILIZATION, ABNORMAL UTERINE BLEEDING        Preop Diagnosis (Codes):    Post op Diagnosis  Encounter for sterilization      Premium Reason  Non-Premium    Comments:

## 2021-08-30 NOTE — ANESTHESIA POSTPROCEDURE EVALUATION
Patient: Alpa Ordoñez    Procedure Summary     Date: 08/30/21 Room / Location: [facility] ROOM 25 / SURGERY SAME DAY [facility]    Anesthesia Start: 1129 Anesthesia Stop: 1343    Procedures:       PELVISCOPY (N/A Abdomen)      SALPINGECTOMY (Bilateral Fallopian Tube)      ABLATION, ENDOMETRIUM, THERMAL, HYSTEROSCOPIC (N/A Uterus)      EXCISION, CYST, OVARY (Left Abdomen) Diagnosis: (hx menorrhagia, simple left ovarian cyst, patient desires permanent sterilization)    Surgeons: Pramod Hoagn M.D. Responsible Provider: Adam Heller M.D.    Anesthesia Type: general ASA Status: 2          Final Anesthesia Type: general  Last vitals  BP   Blood Pressure: 134/66    Temp   37.2 °C (99 °F)    Pulse   (!) 118   Resp   16    SpO2   100 %      Anesthesia Post Evaluation    Patient location during evaluation: PACU  Patient participation: complete - patient participated  Level of consciousness: awake and alert  Pain score: 3    Airway patency: patent  Anesthetic complications: no  Cardiovascular status: hemodynamically stable  Respiratory status: acceptable  Hydration status: euvolemic    PONV: none          No complications documented.     Nurse Pain Score: 0 (NPRS)

## 2021-08-31 NOTE — PROGRESS NOTES
I called and spoke to patient and she is doing well without complaints.  Pain is well controlled and she is passing flatus.  Patient is tolerating p.o. and is voiding without difficulty.  Patient instructed to call us with any questions or concerns

## 2021-09-13 ENCOUNTER — TELEPHONE (OUTPATIENT)
Dept: OBGYN | Facility: CLINIC | Age: 31
End: 2021-09-13

## 2021-09-13 ENCOUNTER — GYNECOLOGY VISIT (OUTPATIENT)
Dept: OBGYN | Facility: CLINIC | Age: 31
End: 2021-09-13
Payer: COMMERCIAL

## 2021-09-13 VITALS
HEIGHT: 63 IN | WEIGHT: 193 LBS | SYSTOLIC BLOOD PRESSURE: 129 MMHG | BODY MASS INDEX: 34.2 KG/M2 | DIASTOLIC BLOOD PRESSURE: 77 MMHG

## 2021-09-13 DIAGNOSIS — Z98.890 S/P LAPAROSCOPY: ICD-10-CM

## 2021-09-13 DIAGNOSIS — Z09 POSTOP CHECK: ICD-10-CM

## 2021-09-13 PROCEDURE — 99024 POSTOP FOLLOW-UP VISIT: CPT | Performed by: OBSTETRICS & GYNECOLOGY

## 2021-09-13 ASSESSMENT — FIBROSIS 4 INDEX: FIB4 SCORE: 0.35

## 2021-09-13 NOTE — PROGRESS NOTES
"Soraida Ordoñez is a 31 y.o. female who presents for Gynecologic Exam (Post op)            HPI patient is a 31-year-old who presents today for 2-week postop visit status post laparoscopic bilateral salpingectomy and also endometrial ablation.  Patient is doing well without any abnormal bleeding.  She denies any fevers or dysuria.  Reports normal bowel and bladder functions.  Reports for the past few days she has had some restless leg and her ears feels blocked.  She denies any respiratory symptoms otherwise.  We discussed comfort measures for symptoms    ROS all organ systems were reviewed and were negative except for complaints in HPI           Objective     /77 (BP Location: Right arm, Patient Position: Sitting, BP Cuff Size: Adult)   Ht 1.6 m (5' 3\")   Wt 87.5 kg (193 lb)   LMP 08/03/2021 (Exact Date)   BMI 34.19 kg/m²      Physical Exam  Vitals and nursing note reviewed. Exam conducted with a chaperone present.   Constitutional:       General: She is not in acute distress.     Appearance: Normal appearance. She is obese. She is not toxic-appearing.   HENT:      Head: Normocephalic and atraumatic.   Eyes:      General: No scleral icterus.        Left eye: No discharge.      Conjunctiva/sclera: Conjunctivae normal.   Cardiovascular:      Rate and Rhythm: Normal rate and regular rhythm.      Pulses: Normal pulses.      Heart sounds: Normal heart sounds.   Pulmonary:      Effort: No respiratory distress.      Breath sounds: Normal breath sounds. No wheezing.   Abdominal:      General: Abdomen is flat. Bowel sounds are normal. There is no distension.      Palpations: Abdomen is soft. There is no mass.      Tenderness: There is no abdominal tenderness.      Hernia: No hernia is present.      Comments: Bilateral lower abdominal incisions are healed.  Mild skin separation present at the umbilicus with no erythema bleeding or discharge.  Skin was reapproximated with benzoin and Steri-Strips "   Musculoskeletal:         General: Normal range of motion.      Cervical back: Normal range of motion and neck supple. No rigidity.      Right lower leg: No edema.      Left lower leg: No edema.   Lymphadenopathy:      Cervical: No cervical adenopathy.   Skin:     General: Skin is warm and dry.      Coloration: Skin is not jaundiced.      Findings: No bruising.   Neurological:      General: No focal deficit present.      Mental Status: She is alert and oriented to person, place, and time.      Coordination: Coordination normal.   Psychiatric:         Mood and Affect: Mood normal.         Behavior: Behavior normal.         Thought Content: Thought content normal.         Judgment: Judgment normal.                             Assessment & Plan        1. Postop check  Patient presents today for 2-week postop check status post laparoscopic bilateral salpingectomy and endometrial ablation.  Patient is doing well.  We discussed symptoms and comfort measures.  Patient can resume full activities  She states insurance may need correction of surgical codes-patient provided with our office number and fax for insurance to contact us with any correction or clarification needed    2. S/P laparoscopy  Surgical findings were discussed.  Pathology report reviewed    3.  Precautions and plan of care reviewed.  Patient to follow-up as needed for any gynecologic problems and for annual gynecologic exam

## 2021-09-13 NOTE — TELEPHONE ENCOUNTER
Pt's insurance company called and they need pt's medical records for billing code [id number]. Advised rep that pt would need to sign a record release.

## 2022-01-03 ENCOUNTER — OFFICE VISIT (OUTPATIENT)
Dept: URGENT CARE | Facility: PHYSICIAN GROUP | Age: 32
End: 2022-01-03
Payer: COMMERCIAL

## 2022-01-03 VITALS
SYSTOLIC BLOOD PRESSURE: 110 MMHG | RESPIRATION RATE: 16 BRPM | WEIGHT: 171 LBS | HEIGHT: 63 IN | TEMPERATURE: 97 F | OXYGEN SATURATION: 97 % | HEART RATE: 67 BPM | DIASTOLIC BLOOD PRESSURE: 82 MMHG | BODY MASS INDEX: 30.3 KG/M2

## 2022-01-03 DIAGNOSIS — M79.605 PAIN IN BOTH LOWER EXTREMITIES: ICD-10-CM

## 2022-01-03 DIAGNOSIS — M79.604 PAIN IN BOTH LOWER EXTREMITIES: ICD-10-CM

## 2022-01-03 PROCEDURE — 99214 OFFICE O/P EST MOD 30 MIN: CPT | Performed by: STUDENT IN AN ORGANIZED HEALTH CARE EDUCATION/TRAINING PROGRAM

## 2022-01-03 ASSESSMENT — ENCOUNTER SYMPTOMS
ABDOMINAL PAIN: 0
PALPITATIONS: 0
MYALGIAS: 1
LEG PAIN: 1
VOMITING: 0
NAUSEA: 0
SORE THROAT: 0
CHILLS: 0
COUGH: 0
HEADACHES: 0
SHORTNESS OF BREATH: 0
FEVER: 0

## 2022-01-03 ASSESSMENT — FIBROSIS 4 INDEX: FIB4 SCORE: 0.35

## 2022-01-04 NOTE — PROGRESS NOTES
"Soraida Ordoñez is a 31 y.o. female who presents with Leg Pain (x 4 months, intermittent cramping/numbess, has progressively gotten worse.  Is worried that it has to do with her COVID vaccine)    Patient reported intermittent leg pain for more than 4 months. She was diagnosed of muscle fatigue per her PCP. The pain has been getting more intense for the past 2 wks. It woke her up in the midnight with cramps. Lasted 5-10 minutes. She still feels leg soreness after the cramp and some numbness.   She reported some feet and hand numbness, ankle swelling likely due to work with long standing.     Received 1 dose of JJ vaccine, booster in 11/2021. She concerned about blood clot due to the vacc and wants to have u/s/    Intermittent mild thorat itching    No f/c/n/v/d, sob, cp, cough, abd pain.           Leg Pain  Associated symptoms include myalgias. Pertinent negatives include no abdominal pain, chest pain, chills, coughing, fever, headaches, nausea, sore throat or vomiting.       Review of Systems   Constitutional: Negative for chills and fever.   HENT: Negative for sore throat.    Respiratory: Negative for cough and shortness of breath.    Cardiovascular: Negative for chest pain and palpitations.   Gastrointestinal: Negative for abdominal pain, nausea and vomiting.   Genitourinary: Negative for dysuria.   Musculoskeletal: Positive for myalgias.   Neurological: Negative for headaches.              Objective     /82   Pulse 67   Temp 36.1 °C (97 °F) (Temporal)   Resp 16   Ht 1.6 m (5' 3\")   Wt 77.6 kg (171 lb)   LMP 08/03/2021 (Exact Date)   SpO2 97%   BMI 30.29 kg/m²      Physical Exam  Constitutional:       Appearance: Normal appearance.   HENT:      Head: Normocephalic.      Nose: Nose normal.   Eyes:      Extraocular Movements: Extraocular movements intact.   Cardiovascular:      Rate and Rhythm: Normal rate.      Pulses: Normal pulses.   Pulmonary:      Effort: Pulmonary effort is normal.    "   Breath sounds: Normal breath sounds.   Abdominal:      Palpations: Abdomen is soft.   Musculoskeletal:         General: Tenderness present. No signs of injury. Normal range of motion.      Cervical back: Normal range of motion.      Comments: BL calf tenderness. Mild ankle swelling   Skin:     General: Skin is warm.   Neurological:      Mental Status: She is alert.                             Assessment & Plan        1. Pain in both lower extremities  chronic leg pain, slightly getting worse, patient concerned about blood clot due to COVID-19 vaccine.  Mild tenderness of lower extremity.     - US-EXTREMITY VENOUS LOWER BILAT; Future    Advised to follow-up with primary care provider for further management  Patient was advised to go to ER or urgent care if symptoms are not improved or getting worse

## 2022-01-19 ENCOUNTER — APPOINTMENT (OUTPATIENT)
Dept: RADIOLOGY | Facility: MEDICAL CENTER | Age: 32
End: 2022-01-19
Attending: STUDENT IN AN ORGANIZED HEALTH CARE EDUCATION/TRAINING PROGRAM
Payer: COMMERCIAL

## 2022-12-10 SDOH — ECONOMIC STABILITY: INCOME INSECURITY: IN THE LAST 12 MONTHS, WAS THERE A TIME WHEN YOU WERE NOT ABLE TO PAY THE MORTGAGE OR RENT ON TIME?: NO

## 2022-12-10 SDOH — ECONOMIC STABILITY: HOUSING INSECURITY: IN THE LAST 12 MONTHS, HOW MANY PLACES HAVE YOU LIVED?: 2

## 2022-12-10 SDOH — ECONOMIC STABILITY: FOOD INSECURITY: WITHIN THE PAST 12 MONTHS, THE FOOD YOU BOUGHT JUST DIDN'T LAST AND YOU DIDN'T HAVE MONEY TO GET MORE.: NEVER TRUE

## 2022-12-10 SDOH — HEALTH STABILITY: PHYSICAL HEALTH: ON AVERAGE, HOW MANY MINUTES DO YOU ENGAGE IN EXERCISE AT THIS LEVEL?: 110 MIN

## 2022-12-10 SDOH — ECONOMIC STABILITY: FOOD INSECURITY: WITHIN THE PAST 12 MONTHS, YOU WORRIED THAT YOUR FOOD WOULD RUN OUT BEFORE YOU GOT MONEY TO BUY MORE.: NEVER TRUE

## 2022-12-10 SDOH — HEALTH STABILITY: PHYSICAL HEALTH: ON AVERAGE, HOW MANY DAYS PER WEEK DO YOU ENGAGE IN MODERATE TO STRENUOUS EXERCISE (LIKE A BRISK WALK)?: 3 DAYS

## 2022-12-10 SDOH — ECONOMIC STABILITY: INCOME INSECURITY: HOW HARD IS IT FOR YOU TO PAY FOR THE VERY BASICS LIKE FOOD, HOUSING, MEDICAL CARE, AND HEATING?: NOT HARD AT ALL

## 2022-12-10 SDOH — HEALTH STABILITY: MENTAL HEALTH
STRESS IS WHEN SOMEONE FEELS TENSE, NERVOUS, ANXIOUS, OR CAN'T SLEEP AT NIGHT BECAUSE THEIR MIND IS TROUBLED. HOW STRESSED ARE YOU?: TO SOME EXTENT

## 2022-12-10 ASSESSMENT — LIFESTYLE VARIABLES
HOW OFTEN DO YOU HAVE SIX OR MORE DRINKS ON ONE OCCASION: LESS THAN MONTHLY
AUDIT-C TOTAL SCORE: 2
HOW OFTEN DO YOU HAVE A DRINK CONTAINING ALCOHOL: MONTHLY OR LESS
HOW MANY STANDARD DRINKS CONTAINING ALCOHOL DO YOU HAVE ON A TYPICAL DAY: 1 OR 2
SKIP TO QUESTIONS 9-10: 0

## 2022-12-10 ASSESSMENT — SOCIAL DETERMINANTS OF HEALTH (SDOH)
WITHIN THE PAST 12 MONTHS, YOU WORRIED THAT YOUR FOOD WOULD RUN OUT BEFORE YOU GOT THE MONEY TO BUY MORE: NEVER TRUE
HOW HARD IS IT FOR YOU TO PAY FOR THE VERY BASICS LIKE FOOD, HOUSING, MEDICAL CARE, AND HEATING?: NOT HARD AT ALL
HOW OFTEN DO YOU ATTEND CHURCH OR RELIGIOUS SERVICES?: NEVER
DO YOU BELONG TO ANY CLUBS OR ORGANIZATIONS SUCH AS CHURCH GROUPS UNIONS, FRATERNAL OR ATHLETIC GROUPS, OR SCHOOL GROUPS?: YES
HOW OFTEN DO YOU ATTENT MEETINGS OF THE CLUB OR ORGANIZATION YOU BELONG TO?: MORE THAN 4 TIMES PER YEAR
HOW OFTEN DO YOU ATTEND CHURCH OR RELIGIOUS SERVICES?: NEVER
HOW OFTEN DO YOU GET TOGETHER WITH FRIENDS OR RELATIVES?: TWICE A WEEK
HOW OFTEN DO YOU GET TOGETHER WITH FRIENDS OR RELATIVES?: TWICE A WEEK
HOW MANY DRINKS CONTAINING ALCOHOL DO YOU HAVE ON A TYPICAL DAY WHEN YOU ARE DRINKING: 1 OR 2
HOW OFTEN DO YOU ATTENT MEETINGS OF THE CLUB OR ORGANIZATION YOU BELONG TO?: MORE THAN 4 TIMES PER YEAR
IN A TYPICAL WEEK, HOW MANY TIMES DO YOU TALK ON THE PHONE WITH FAMILY, FRIENDS, OR NEIGHBORS?: ONCE A WEEK
HOW OFTEN DO YOU HAVE A DRINK CONTAINING ALCOHOL: MONTHLY OR LESS
DO YOU BELONG TO ANY CLUBS OR ORGANIZATIONS SUCH AS CHURCH GROUPS UNIONS, FRATERNAL OR ATHLETIC GROUPS, OR SCHOOL GROUPS?: YES
IN A TYPICAL WEEK, HOW MANY TIMES DO YOU TALK ON THE PHONE WITH FAMILY, FRIENDS, OR NEIGHBORS?: ONCE A WEEK
HOW OFTEN DO YOU HAVE SIX OR MORE DRINKS ON ONE OCCASION: LESS THAN MONTHLY

## 2022-12-13 ENCOUNTER — OFFICE VISIT (OUTPATIENT)
Dept: MEDICAL GROUP | Facility: PHYSICIAN GROUP | Age: 32
End: 2022-12-13
Payer: COMMERCIAL

## 2022-12-13 VITALS
DIASTOLIC BLOOD PRESSURE: 68 MMHG | TEMPERATURE: 97.8 F | SYSTOLIC BLOOD PRESSURE: 104 MMHG | BODY MASS INDEX: 33.2 KG/M2 | HEART RATE: 54 BPM | OXYGEN SATURATION: 98 % | HEIGHT: 63 IN | WEIGHT: 187.4 LBS

## 2022-12-13 DIAGNOSIS — E55.9 VITAMIN D DEFICIENCY: ICD-10-CM

## 2022-12-13 DIAGNOSIS — Z98.890 S/P ENDOMETRIAL ABLATION: ICD-10-CM

## 2022-12-13 DIAGNOSIS — E78.5 DYSLIPIDEMIA: ICD-10-CM

## 2022-12-13 DIAGNOSIS — R73.01 ELEVATED FASTING GLUCOSE: ICD-10-CM

## 2022-12-13 DIAGNOSIS — M53.3 COCCYX PAIN: ICD-10-CM

## 2022-12-13 DIAGNOSIS — Z90.79 STATUS POST BILATERAL SALPINGECTOMY: ICD-10-CM

## 2022-12-13 PROCEDURE — 99214 OFFICE O/P EST MOD 30 MIN: CPT

## 2022-12-13 ASSESSMENT — FIBROSIS 4 INDEX: FIB4 SCORE: 0.36

## 2022-12-13 ASSESSMENT — ENCOUNTER SYMPTOMS
CONSTIPATION: 0
WEIGHT LOSS: 0
HEADACHES: 0
NAUSEA: 0
VOMITING: 0
SHORTNESS OF BREATH: 0
DIZZINESS: 0
ABDOMINAL PAIN: 0
CHILLS: 0
MYALGIAS: 0
DIARRHEA: 0
BLURRED VISION: 0
FEVER: 0
COUGH: 0
WEAKNESS: 0

## 2022-12-13 ASSESSMENT — PATIENT HEALTH QUESTIONNAIRE - PHQ9: CLINICAL INTERPRETATION OF PHQ2 SCORE: 0

## 2022-12-13 NOTE — PROGRESS NOTES
Subjective:     CC:  Diagnoses of Coccyx pain, Status post bilateral salpingectomy, S/P endometrial ablation, BMI 33.0-33.9,adult, Dyslipidemia, Vitamin D deficiency, and Elevated fasting glucose were pertinent to this visit.    HISTORY OF THE PRESENT ILLNESS: Patient is a 32 y.o. female. This pleasant patient is here today to establish care and discuss the following problems:    Problem   Coccyx Pain    Onset: 9+ months  Location coccyx  Radiation localized to coccyx  Duration Intermittent  Character is sharp/ache  Alleviated by changing position  Exacerbated by lying on side  Associated symptoms-None    2 falls in the past on tailbone doing roller derby 3 years ago  -No previous imaging  -Treatments tried: position changes, no medications  -Does have a history of chronic intermittent LBP with left sided sciatica (occasional right). These condition do not feel related to her.        Bmi 33.0-33.9,Adult    Chronic condition stable patient has lost 55 lbs over the past year.  She did this using a low-carb diet.  She has plateaued and is gone back to a more regular eating pattern and is using the [employer] bev.     Dyslipidemia    Eats mostly healthy  -Active, exercises 2-3 days per week doing roller derby   Latest Reference Range & Units 06/04/21 11:09   Cholesterol,Tot 100 - 199 mg/dL 184   Triglycerides 0 - 149 mg/dL 120   HDL >=40 mg/dL 36 !   LDL <100 mg/dL 124 (H)        Vitamin D Deficiency    Chronic, not currently supplementing     Elevated Fasting Glucose    Last lab values reviewed from August 2021 revealing fasting glucose at 100.  -Patient reports mostly healthy diet and has lost 55 pounds over the course of the last year  - She is active and reorder the  - No current symptomology of diabetes such as polyuria, polydipsia, or polyphagia or peripheral neuropathy       Status Post Bilateral Salpingectomy   S/P Endometrial Ablation    Patient had endometrial ablation secondary to heavy menstrual flow.  She is  "doing well after this procedure and still gets monthly periods that are regular but much more moderate.  She denies excessive cramping.         Health Maintenance: Recommend follow-up for health maintenance topics      ROS:   Review of Systems   Constitutional:  Negative for chills, fever, malaise/fatigue and weight loss.   Eyes:  Negative for blurred vision.   Respiratory:  Negative for cough and shortness of breath.    Cardiovascular:  Negative for chest pain.   Gastrointestinal:  Negative for abdominal pain, constipation, diarrhea, nausea and vomiting.   Musculoskeletal:  Negative for myalgias.   Neurological:  Negative for dizziness, weakness and headaches.       Objective:     Exam: /68 (BP Location: Left arm, Patient Position: Sitting, BP Cuff Size: Small adult)   Pulse (!) 54   Temp 36.6 °C (97.8 °F) (Temporal)   Ht 1.6 m (5' 3\")   Wt 85 kg (187 lb 6.4 oz)   SpO2 98%  Body mass index is 33.2 kg/m².    Physical Exam  Constitutional:       General: She is not in acute distress.     Appearance: Normal appearance. She is not ill-appearing or toxic-appearing.   HENT:      Head: Normocephalic.   Eyes:      Conjunctiva/sclera: Conjunctivae normal.   Cardiovascular:      Rate and Rhythm: Normal rate and regular rhythm.      Pulses: Normal pulses.      Heart sounds: Normal heart sounds. No murmur heard.  Pulmonary:      Effort: Pulmonary effort is normal. No respiratory distress.      Breath sounds: Normal breath sounds.   Skin:     General: Skin is warm and dry.   Neurological:      General: No focal deficit present.      Mental Status: She is alert and oriented to person, place, and time.   Psychiatric:         Mood and Affect: Mood normal.         Behavior: Behavior normal.         Labs: no recent labs    Assessment & Plan: Medical Decision Making   32 y.o. female with the following -    Problem List Items Addressed This Visit       Status post bilateral salpingectomy    S/P endometrial ablation     " Condition resolved  - Continue to follow with GYN for recurrence         Coccyx pain     Chronic condition uncontrolled  - Imaging ordered for sacrum-coccyx  - Patient declines referral to PT at this time but will consider at a future option should condition persist  - RICE modalities encouraged including use of NSAIDs if needed for exacerbation or persistent pain           Relevant Orders    DX-SACRUM AND COCCYX 2+    BMI 33.0-33.9,adult     Chronic condition improving  - Continue diligent efforts towards healthy diet and exercise as discussed         Relevant Orders    CBC WITHOUT DIFFERENTIAL    Comp Metabolic Panel    Lipid Profile    HEMOGLOBIN A1C    TSH WITH REFLEX TO FT4    VITAMIN D,25 HYDROXY (DEFICIENCY)    Dyslipidemia     Chronic condition of uncertain progression as last labs were over a year ago  - She has lost a significant amount of weight  -We will recheck lipid panel at next lab draw         Relevant Orders    CBC WITHOUT DIFFERENTIAL    Comp Metabolic Panel    Lipid Profile    HEMOGLOBIN A1C    TSH WITH REFLEX TO FT4    VITAMIN D,25 HYDROXY (DEFICIENCY)    Vitamin D deficiency     Chronic condition uncontrolled  - Strongly recommend taking vitamin D3 at least 2000 IUs daily         Relevant Orders    CBC WITHOUT DIFFERENTIAL    Comp Metabolic Panel    Lipid Profile    HEMOGLOBIN A1C    TSH WITH REFLEX TO FT4    VITAMIN D,25 HYDROXY (DEFICIENCY)    Elevated fasting glucose     Chronic condition of uncertain progression  - We will recheck CMP and also glycosylated hemoglobin and next lab draw         Relevant Orders    CBC WITHOUT DIFFERENTIAL    Comp Metabolic Panel    Lipid Profile    HEMOGLOBIN A1C    TSH WITH REFLEX TO FT4    VITAMIN D,25 HYDROXY (DEFICIENCY)       Differential diagnosis, natural history, supportive care, and indications for immediate follow-up discussed.  Shared decision making approach was utilized, and patient is amendable with plan of care.  Patient understands to return to  clinic or go to the emergency department if symptoms worsen. All questions and concerns addressed.      Return in about 1 year (around 12/13/2023).    Please note that this dictation was created using voice recognition software. I have made every reasonable attempt to correct obvious errors, but I expect that there are errors of grammar and possibly content that I did not discover before finalizing the note.

## 2022-12-14 NOTE — ASSESSMENT & PLAN NOTE
Chronic condition improving  - Continue diligent efforts towards healthy diet and exercise as discussed

## 2022-12-14 NOTE — ASSESSMENT & PLAN NOTE
Chronic condition of uncertain progression  - We will recheck CMP and also glycosylated hemoglobin and next lab draw

## 2022-12-14 NOTE — ASSESSMENT & PLAN NOTE
Chronic condition of uncertain progression as last labs were over a year ago  - She has lost a significant amount of weight  -We will recheck lipid panel at next lab draw

## 2022-12-14 NOTE — ASSESSMENT & PLAN NOTE
Chronic condition uncontrolled  - Imaging ordered for sacrum-coccyx  - Patient declines referral to PT at this time but will consider at a future option should condition persist  - RICE modalities encouraged including use of NSAIDs if needed for exacerbation or persistent pain

## 2022-12-15 ENCOUNTER — APPOINTMENT (RX ONLY)
Dept: URBAN - METROPOLITAN AREA CLINIC 36 | Facility: CLINIC | Age: 32
Setting detail: DERMATOLOGY
End: 2022-12-15

## 2022-12-15 DIAGNOSIS — Z41.9 ENCOUNTER FOR PROCEDURE FOR PURPOSES OTHER THAN REMEDYING HEALTH STATE, UNSPECIFIED: ICD-10-CM

## 2022-12-15 PROCEDURE — ? VBEAM PRIMA LASER

## 2022-12-15 ASSESSMENT — LOCATION ZONE DERM: LOCATION ZONE: FACE

## 2022-12-15 ASSESSMENT — LOCATION DETAILED DESCRIPTION DERM: LOCATION DETAILED: RIGHT CENTRAL EYEBROW

## 2022-12-15 ASSESSMENT — LOCATION SIMPLE DESCRIPTION DERM: LOCATION SIMPLE: RIGHT EYEBROW

## 2022-12-15 NOTE — PROCEDURE: VBEAM PRIMA LASER
Treated Area: small area
Delay Time (Ms): 20
Price (Use Numbers Only, No Special Characters Or $): 75
Cryogen Time (Ms): 30
Spot Size: 7 mm
Cryogen Time (Ms): 50
Consent: Written consent obtained, risks reviewed including but not limited to crusting, scabbing, blistering, scarring, darker or lighter pigmentary change, incidental hair removal, bruising, and/or incomplete removal.
Fluence In J/Cm2 (Optional): 80
Post-Care Instructions: I reviewed with the patient in detail post-care instructions. Patient should stay away from the sun and wear sun protection until treated areas are fully healed.
Detail Level: Zone
Spot Size: 3 mm
Pulse Duration: 1.5 ms
Post-Procedure Care: Vaseline and ice applied. Post care reviewed with patient.
Spot Size: 5 mm
Fluence In J/Cm2 (Optional): 14.0
Pulse Duration: 20 ms

## 2022-12-22 ENCOUNTER — HOSPITAL ENCOUNTER (OUTPATIENT)
Dept: LAB | Facility: MEDICAL CENTER | Age: 32
End: 2022-12-22
Payer: COMMERCIAL

## 2022-12-22 ENCOUNTER — APPOINTMENT (OUTPATIENT)
Dept: RADIOLOGY | Facility: MEDICAL CENTER | Age: 32
End: 2022-12-22
Payer: COMMERCIAL

## 2022-12-22 DIAGNOSIS — E78.5 DYSLIPIDEMIA: ICD-10-CM

## 2022-12-22 DIAGNOSIS — M53.3 COCCYX PAIN: ICD-10-CM

## 2022-12-22 DIAGNOSIS — R73.01 ELEVATED FASTING GLUCOSE: ICD-10-CM

## 2022-12-22 DIAGNOSIS — E55.9 VITAMIN D DEFICIENCY: ICD-10-CM

## 2022-12-22 LAB
ALBUMIN SERPL BCP-MCNC: 4.6 G/DL (ref 3.2–4.9)
ALBUMIN/GLOB SERPL: 1.3 G/DL
ALP SERPL-CCNC: 81 U/L (ref 30–99)
ALT SERPL-CCNC: 12 U/L (ref 2–50)
ANION GAP SERPL CALC-SCNC: 12 MMOL/L (ref 7–16)
AST SERPL-CCNC: 14 U/L (ref 12–45)
BILIRUB SERPL-MCNC: 0.3 MG/DL (ref 0.1–1.5)
BUN SERPL-MCNC: 14 MG/DL (ref 8–22)
CALCIUM ALBUM COR SERPL-MCNC: 8.9 MG/DL (ref 8.5–10.5)
CALCIUM SERPL-MCNC: 9.4 MG/DL (ref 8.4–10.2)
CHLORIDE SERPL-SCNC: 110 MMOL/L (ref 96–112)
CHOLEST SERPL-MCNC: 205 MG/DL (ref 100–199)
CO2 SERPL-SCNC: 21 MMOL/L (ref 20–33)
CREAT SERPL-MCNC: 0.52 MG/DL (ref 0.5–1.4)
ERYTHROCYTE [DISTWIDTH] IN BLOOD BY AUTOMATED COUNT: 42.8 FL (ref 35.9–50)
EST. AVERAGE GLUCOSE BLD GHB EST-MCNC: 108 MG/DL
GFR SERPLBLD CREATININE-BSD FMLA CKD-EPI: 125 ML/MIN/1.73 M 2
GLOBULIN SER CALC-MCNC: 3.5 G/DL (ref 1.9–3.5)
GLUCOSE SERPL-MCNC: 103 MG/DL (ref 65–99)
HBA1C MFR BLD: 5.4 % (ref 4–5.6)
HCT VFR BLD AUTO: 42.1 % (ref 37–47)
HDLC SERPL-MCNC: 60 MG/DL
HGB BLD-MCNC: 14.2 G/DL (ref 12–16)
LDLC SERPL CALC-MCNC: 126 MG/DL
MCH RBC QN AUTO: 29.8 PG (ref 27–33)
MCHC RBC AUTO-ENTMCNC: 33.7 G/DL (ref 33.6–35)
MCV RBC AUTO: 88.3 FL (ref 81.4–97.8)
PLATELET # BLD AUTO: 263 K/UL (ref 164–446)
PMV BLD AUTO: 9.8 FL (ref 9–12.9)
POTASSIUM SERPL-SCNC: 5.1 MMOL/L (ref 3.6–5.5)
PROT SERPL-MCNC: 8.1 G/DL (ref 6–8.2)
RBC # BLD AUTO: 4.77 M/UL (ref 4.2–5.4)
SODIUM SERPL-SCNC: 143 MMOL/L (ref 135–145)
TRIGL SERPL-MCNC: 95 MG/DL (ref 0–149)
TSH SERPL DL<=0.005 MIU/L-ACNC: 2.25 UIU/ML (ref 0.38–5.33)
WBC # BLD AUTO: 8.2 K/UL (ref 4.8–10.8)

## 2022-12-22 PROCEDURE — 84443 ASSAY THYROID STIM HORMONE: CPT

## 2022-12-22 PROCEDURE — 72220 X-RAY EXAM SACRUM TAILBONE: CPT

## 2022-12-22 PROCEDURE — 85027 COMPLETE CBC AUTOMATED: CPT

## 2022-12-22 PROCEDURE — 83036 HEMOGLOBIN GLYCOSYLATED A1C: CPT

## 2022-12-22 PROCEDURE — 82306 VITAMIN D 25 HYDROXY: CPT

## 2022-12-22 PROCEDURE — 36415 COLL VENOUS BLD VENIPUNCTURE: CPT

## 2022-12-22 PROCEDURE — 80053 COMPREHEN METABOLIC PANEL: CPT

## 2022-12-22 PROCEDURE — 80061 LIPID PANEL: CPT

## 2022-12-23 LAB — 25(OH)D3 SERPL-MCNC: 16 NG/ML (ref 30–100)

## 2023-10-25 ENCOUNTER — HOSPITAL ENCOUNTER (OUTPATIENT)
Dept: LAB | Facility: MEDICAL CENTER | Age: 33
End: 2023-10-25
Payer: COMMERCIAL

## 2023-10-25 ENCOUNTER — OFFICE VISIT (OUTPATIENT)
Dept: MEDICAL GROUP | Facility: PHYSICIAN GROUP | Age: 33
End: 2023-10-25
Payer: COMMERCIAL

## 2023-10-25 VITALS
WEIGHT: 169 LBS | SYSTOLIC BLOOD PRESSURE: 100 MMHG | TEMPERATURE: 98.1 F | HEIGHT: 63 IN | BODY MASS INDEX: 29.95 KG/M2 | HEART RATE: 62 BPM | DIASTOLIC BLOOD PRESSURE: 60 MMHG | OXYGEN SATURATION: 97 %

## 2023-10-25 DIAGNOSIS — Z90.3 H/O GASTRIC SLEEVE: ICD-10-CM

## 2023-10-25 DIAGNOSIS — Z11.59 NEED FOR HEPATITIS C SCREENING TEST: ICD-10-CM

## 2023-10-25 DIAGNOSIS — Z23 NEED FOR VACCINATION: ICD-10-CM

## 2023-10-25 DIAGNOSIS — Z13.6 SCREENING FOR CARDIOVASCULAR CONDITION: ICD-10-CM

## 2023-10-25 DIAGNOSIS — Z11.3 SCREENING EXAMINATION FOR SEXUALLY TRANSMITTED DISEASE: ICD-10-CM

## 2023-10-25 DIAGNOSIS — E66.3 OVERWEIGHT (BMI 25.0-29.9): ICD-10-CM

## 2023-10-25 LAB
BASOPHILS # BLD AUTO: 0.9 % (ref 0–1.8)
BASOPHILS # BLD: 0.08 K/UL (ref 0–0.12)
EOSINOPHIL # BLD AUTO: 0.12 K/UL (ref 0–0.51)
EOSINOPHIL NFR BLD: 1.3 % (ref 0–6.9)
ERYTHROCYTE [DISTWIDTH] IN BLOOD BY AUTOMATED COUNT: 46.4 FL (ref 35.9–50)
EST. AVERAGE GLUCOSE BLD GHB EST-MCNC: 105 MG/DL
HBA1C MFR BLD: 5.3 % (ref 4–5.6)
HCT VFR BLD AUTO: 39.6 % (ref 37–47)
HGB BLD-MCNC: 13.1 G/DL (ref 12–16)
IMM GRANULOCYTES # BLD AUTO: 0.01 K/UL (ref 0–0.11)
IMM GRANULOCYTES NFR BLD AUTO: 0.1 % (ref 0–0.9)
LYMPHOCYTES # BLD AUTO: 2.92 K/UL (ref 1–4.8)
LYMPHOCYTES NFR BLD: 31.8 % (ref 22–41)
MCH RBC QN AUTO: 29.3 PG (ref 27–33)
MCHC RBC AUTO-ENTMCNC: 33.1 G/DL (ref 32.2–35.5)
MCV RBC AUTO: 88.6 FL (ref 81.4–97.8)
MONOCYTES # BLD AUTO: 0.57 K/UL (ref 0–0.85)
MONOCYTES NFR BLD AUTO: 6.2 % (ref 0–13.4)
NEUTROPHILS # BLD AUTO: 5.48 K/UL (ref 1.82–7.42)
NEUTROPHILS NFR BLD: 59.7 % (ref 44–72)
NRBC # BLD AUTO: 0 K/UL
NRBC BLD-RTO: 0 /100 WBC (ref 0–0.2)
PLATELET # BLD AUTO: 267 K/UL (ref 164–446)
PMV BLD AUTO: 11.3 FL (ref 9–12.9)
RBC # BLD AUTO: 4.47 M/UL (ref 4.2–5.4)
WBC # BLD AUTO: 9.2 K/UL (ref 4.8–10.8)

## 2023-10-25 PROCEDURE — 3078F DIAST BP <80 MM HG: CPT

## 2023-10-25 PROCEDURE — 90471 IMMUNIZATION ADMIN: CPT

## 2023-10-25 PROCEDURE — 90746 HEPB VACCINE 3 DOSE ADULT IM: CPT

## 2023-10-25 PROCEDURE — 83036 HEMOGLOBIN GLYCOSYLATED A1C: CPT

## 2023-10-25 PROCEDURE — 85025 COMPLETE CBC W/AUTO DIFF WBC: CPT

## 2023-10-25 PROCEDURE — 99214 OFFICE O/P EST MOD 30 MIN: CPT | Mod: 25

## 2023-10-25 PROCEDURE — 90472 IMMUNIZATION ADMIN EACH ADD: CPT

## 2023-10-25 PROCEDURE — 3074F SYST BP LT 130 MM HG: CPT

## 2023-10-25 PROCEDURE — 36415 COLL VENOUS BLD VENIPUNCTURE: CPT

## 2023-10-25 PROCEDURE — 90632 HEPA VACCINE ADULT IM: CPT

## 2023-10-25 PROCEDURE — 90715 TDAP VACCINE 7 YRS/> IM: CPT

## 2023-10-25 PROCEDURE — 86803 HEPATITIS C AB TEST: CPT

## 2023-10-25 PROCEDURE — 87389 HIV-1 AG W/HIV-1&-2 AB AG IA: CPT

## 2023-10-25 ASSESSMENT — ENCOUNTER SYMPTOMS
FEVER: 0
DIARRHEA: 0
BLURRED VISION: 0
COUGH: 0
VOMITING: 0
NAUSEA: 0
WEIGHT LOSS: 0
SHORTNESS OF BREATH: 0
WEAKNESS: 0
HEADACHES: 0
DIZZINESS: 0
ABDOMINAL PAIN: 0
MYALGIAS: 0
CONSTIPATION: 0
CHILLS: 0

## 2023-10-25 ASSESSMENT — PATIENT HEALTH QUESTIONNAIRE - PHQ9: CLINICAL INTERPRETATION OF PHQ2 SCORE: 0

## 2023-10-25 ASSESSMENT — FIBROSIS 4 INDEX: FIB4 SCORE: 0.54

## 2023-10-25 NOTE — PROGRESS NOTES
"Subjective:     CC: vaccines and screening    HPI:   Alpa presents today with    Problem   H/O Gastric Sleeve    Patient reports having gastric sleeve completed 6 weeks ago in [place]. Patient states she is feeling outstanding.  Patient is here today requesting full lab work-up as she is unsure about lab results that were completed in [place].     Overweight (Bmi 25.0-29.9)    Chronic condition  -Patient weight today is 169 pounds BMI 29.94           ROS:  Review of Systems   Constitutional:  Negative for chills, fever, malaise/fatigue and weight loss.   Eyes:  Negative for blurred vision.   Respiratory:  Negative for cough and shortness of breath.    Cardiovascular:  Negative for chest pain.   Gastrointestinal:  Negative for abdominal pain, constipation, diarrhea, nausea and vomiting.   Musculoskeletal:  Negative for myalgias.   Neurological:  Negative for dizziness, weakness and headaches.       Objective:     Exam:  /60 (BP Location: Left arm, Patient Position: Sitting, BP Cuff Size: Adult)   Pulse 62   Temp 36.7 °C (98.1 °F) (Temporal)   Ht 1.6 m (5' 3\")   Wt 76.7 kg (169 lb)   SpO2 97%   BMI 29.94 kg/m²  Body mass index is 29.94 kg/m².    Physical Exam  Constitutional:       General: She is not in acute distress.     Appearance: Normal appearance. She is not ill-appearing or toxic-appearing.   HENT:      Head: Normocephalic.   Eyes:      Conjunctiva/sclera: Conjunctivae normal.   Cardiovascular:      Rate and Rhythm: Normal rate and regular rhythm.      Pulses: Normal pulses.      Heart sounds: Normal heart sounds. No murmur heard.  Pulmonary:      Effort: Pulmonary effort is normal. No respiratory distress.      Breath sounds: Normal breath sounds.   Skin:     General: Skin is warm and dry.   Neurological:      General: No focal deficit present.      Mental Status: She is alert and oriented to person, place, and time.   Psychiatric:         Mood and Affect: Mood normal.         Behavior: Behavior " normal.         Labs: No recent labs    Assessment & Plan: Medical Decision Making     33 y.o. female with the following -     Problem List Items Addressed This Visit       H/O gastric sleeve     Chronic stable condition  -Recommend daily multivitamins and adequate hydration         Relevant Orders    CBC WITH DIFFERENTIAL    Comp Metabolic Panel    Lipid Profile    HEMOGLOBIN A1C    Overweight (BMI 25.0-29.9)     Chronic condition  -Recommended to continue with healthy lifestyle  -Exercise: At least 150 minutes of moderate aerobic activity per week or 75 minutes of vigorous aerobic activity per week, +2 days/week of strength training  - Healthy lifestyle and eating habits: Mediterranean-based diet (rich in fruits, vegetables, nuts and healthy oils), proper hydration and avoiding sugary beverages, adequate sleep hygiene-(allowing 7 to 8 hours of overnight sleep).           Relevant Orders    CBC WITH DIFFERENTIAL    Comp Metabolic Panel    Lipid Profile    HEMOGLOBIN A1C     Other Visit Diagnoses       Need for vaccination        Relevant Orders    Hepatitis B Vaccine Adult 20+    Hep A Adult 19+    Tdap Vaccine =>8YO IM    Need for hepatitis C screening test        Relevant Orders    HEP C VIRUS ANTIBODY    Screening examination for sexually transmitted disease        Relevant Orders    HIV AG/AB COMBO ASSAY SCREENING    Screening for cardiovascular condition        Relevant Orders    Lipid Profile    HEMOGLOBIN A1C            Differential diagnosis, natural history, supportive care, and indications for immediate follow-up discussed.  Shared decision making approach utilized, and patient is amendable with plan of care.  Patient understands to return to clinic or go to the emergency department if symptoms worsen. All questions and concerns addressed to the best of my knowledge.    Return for Annual Wellness Visit.    Please note that this dictation was created using voice recognition software. I have made every  reasonable attempt to correct obvious errors, but I expect that there are errors of grammar and possibly content that I did not discover before finalizing the note.

## 2023-10-25 NOTE — ASSESSMENT & PLAN NOTE
Chronic condition  -Recommended to continue with healthy lifestyle  -Exercise: At least 150 minutes of moderate aerobic activity per week or 75 minutes of vigorous aerobic activity per week, +2 days/week of strength training  - Healthy lifestyle and eating habits: Mediterranean-based diet (rich in fruits, vegetables, nuts and healthy oils), proper hydration and avoiding sugary beverages, adequate sleep hygiene-(allowing 7 to 8 hours of overnight sleep).

## 2023-10-26 LAB
HCV AB SER QL: NORMAL
HIV 1+2 AB+HIV1 P24 AG SERPL QL IA: NORMAL

## 2023-10-28 ENCOUNTER — HOSPITAL ENCOUNTER (OUTPATIENT)
Dept: LAB | Facility: MEDICAL CENTER | Age: 33
End: 2023-10-28
Payer: COMMERCIAL

## 2023-10-28 DIAGNOSIS — Z90.3 H/O GASTRIC SLEEVE: ICD-10-CM

## 2023-10-28 DIAGNOSIS — E66.3 OVERWEIGHT (BMI 25.0-29.9): ICD-10-CM

## 2023-10-28 DIAGNOSIS — Z13.6 SCREENING FOR CARDIOVASCULAR CONDITION: ICD-10-CM

## 2023-10-28 LAB
ALBUMIN SERPL BCP-MCNC: 4.2 G/DL (ref 3.2–4.9)
ALBUMIN/GLOB SERPL: 1.3 G/DL
ALP SERPL-CCNC: 81 U/L (ref 30–99)
ALT SERPL-CCNC: 25 U/L (ref 2–50)
ANION GAP SERPL CALC-SCNC: 12 MMOL/L (ref 7–16)
AST SERPL-CCNC: 24 U/L (ref 12–45)
BILIRUB SERPL-MCNC: 0.3 MG/DL (ref 0.1–1.5)
BUN SERPL-MCNC: 9 MG/DL (ref 8–22)
CALCIUM ALBUM COR SERPL-MCNC: 9.2 MG/DL (ref 8.5–10.5)
CALCIUM SERPL-MCNC: 9.4 MG/DL (ref 8.5–10.5)
CHLORIDE SERPL-SCNC: 105 MMOL/L (ref 96–112)
CHOLEST SERPL-MCNC: 152 MG/DL (ref 100–199)
CO2 SERPL-SCNC: 22 MMOL/L (ref 20–33)
CREAT SERPL-MCNC: 0.49 MG/DL (ref 0.5–1.4)
FASTING STATUS PATIENT QL REPORTED: NORMAL
GFR SERPLBLD CREATININE-BSD FMLA CKD-EPI: 126 ML/MIN/1.73 M 2
GLOBULIN SER CALC-MCNC: 3.3 G/DL (ref 1.9–3.5)
GLUCOSE SERPL-MCNC: 96 MG/DL (ref 65–99)
HDLC SERPL-MCNC: 31 MG/DL
LDLC SERPL CALC-MCNC: 99 MG/DL
POTASSIUM SERPL-SCNC: 4.4 MMOL/L (ref 3.6–5.5)
PROT SERPL-MCNC: 7.5 G/DL (ref 6–8.2)
SODIUM SERPL-SCNC: 139 MMOL/L (ref 135–145)
TRIGL SERPL-MCNC: 109 MG/DL (ref 0–149)

## 2023-10-28 PROCEDURE — 80053 COMPREHEN METABOLIC PANEL: CPT

## 2023-10-28 PROCEDURE — 36415 COLL VENOUS BLD VENIPUNCTURE: CPT

## 2023-10-28 PROCEDURE — 80061 LIPID PANEL: CPT

## 2023-11-01 ENCOUNTER — OFFICE VISIT (OUTPATIENT)
Dept: MEDICAL GROUP | Facility: PHYSICIAN GROUP | Age: 33
End: 2023-11-01
Payer: COMMERCIAL

## 2023-11-01 VITALS
HEART RATE: 56 BPM | WEIGHT: 166 LBS | TEMPERATURE: 98.1 F | SYSTOLIC BLOOD PRESSURE: 90 MMHG | BODY MASS INDEX: 29.41 KG/M2 | OXYGEN SATURATION: 98 % | HEIGHT: 63 IN | DIASTOLIC BLOOD PRESSURE: 60 MMHG

## 2023-11-01 DIAGNOSIS — M54.41 CHRONIC RIGHT-SIDED LOW BACK PAIN WITH RIGHT-SIDED SCIATICA: ICD-10-CM

## 2023-11-01 DIAGNOSIS — G89.29 CHRONIC RIGHT-SIDED LOW BACK PAIN WITH RIGHT-SIDED SCIATICA: ICD-10-CM

## 2023-11-01 PROCEDURE — 3078F DIAST BP <80 MM HG: CPT

## 2023-11-01 PROCEDURE — 99214 OFFICE O/P EST MOD 30 MIN: CPT

## 2023-11-01 PROCEDURE — 3074F SYST BP LT 130 MM HG: CPT

## 2023-11-01 ASSESSMENT — FIBROSIS 4 INDEX: FIB4 SCORE: 0.63

## 2023-11-01 ASSESSMENT — ENCOUNTER SYMPTOMS
WEIGHT LOSS: 0
SHORTNESS OF BREATH: 0
FEVER: 0
CONSTIPATION: 0
NAUSEA: 0
ABDOMINAL PAIN: 0
MYALGIAS: 0
BLURRED VISION: 0
CHILLS: 0
BACK PAIN: 1
COUGH: 0
VOMITING: 0
DIARRHEA: 0
DIZZINESS: 0
WEAKNESS: 0
HEADACHES: 0

## 2023-11-01 NOTE — ASSESSMENT & PLAN NOTE
Chronic in nature, uncontrolled  - Recommend referral to physiatry and physical therapy  - Imaging ordered given history of trauma and radiculopathy  - Patient had recent bariatric surgery, therefore will avoid oral NSAIDs/steroids. Recommend Lidocaine patch daily and Tylenol 500-1000 mg every 8 hours as needed for pain

## 2023-11-01 NOTE — PROGRESS NOTES
"Subjective:     CC: back pain and sciatica    HPI:   Alpa presents today with    Problem   Chronic Right-Sided Low Back Pain With Right-Sided Sciatica    Onset: July 2023 - patient states that she fell on her tailbone during roller [place]  Location Low back, primarily on the right side  Radiation down right leg  Duration intermittent  Character throbbing, aching   Alleviated by repositioning, stretching, ibuprofen/tyelonol, massages, chiropractor   Exacerbated by sleeping, standing  Associated symptoms denies bowel or bladder changes, saddle anesthesia          ROS:  Review of Systems   Constitutional:  Negative for chills, fever, malaise/fatigue and weight loss.   Eyes:  Negative for blurred vision.   Respiratory:  Negative for cough and shortness of breath.    Cardiovascular:  Negative for chest pain.   Gastrointestinal:  Negative for abdominal pain, constipation, diarrhea, nausea and vomiting.   Musculoskeletal:  Positive for back pain. Negative for myalgias.   Neurological:  Negative for dizziness, weakness and headaches.       Objective:     Exam:  BP 90/60 (BP Location: Left arm, Patient Position: Sitting, BP Cuff Size: Adult)   Pulse (!) 56   Temp 36.7 °C (98.1 °F) (Temporal)   Ht 1.6 m (5' 3\")   Wt 75.3 kg (166 lb)   SpO2 98%   BMI 29.41 kg/m²  Body mass index is 29.41 kg/m².    Physical Exam  Constitutional:       General: She is not in acute distress.     Appearance: Normal appearance. She is not ill-appearing or toxic-appearing.   HENT:      Head: Normocephalic.   Eyes:      Conjunctiva/sclera: Conjunctivae normal.   Cardiovascular:      Rate and Rhythm: Normal rate and regular rhythm.      Pulses: Normal pulses.      Heart sounds: Normal heart sounds. No murmur heard.  Pulmonary:      Effort: Pulmonary effort is normal. No respiratory distress.      Breath sounds: Normal breath sounds.   Skin:     General: Skin is warm and dry.   Neurological:      General: No focal deficit present.      Mental " Status: She is alert and oriented to person, place, and time.   Psychiatric:         Mood and Affect: Mood normal.         Behavior: Behavior normal.     Examination of the L-spine:  Decreased motion of the l-spine secondary to pain. No midline spinous process tenderness.  tenderness to the paraspinous muscles of the L-spine.  No Tenderness to the SI joints. 5/5 strength of the illiosoas, 5/5 strength of the quads, 5/5 hamstrings, 5/5 PF, 5/5 DF. Lower Extremity DTRs intact. Sensation is grossly intact.     Back:  Back symmetric, no curvature. Mild discomfort with forward bending.    Labs:   Hospital Outpatient Visit on 10/28/2023   Component Date Value    Cholesterol,Tot 10/28/2023 152     Triglycerides 10/28/2023 109     HDL 10/28/2023 31 (A)     LDL 10/28/2023 99     Sodium 10/28/2023 139     Potassium 10/28/2023 4.4     Chloride 10/28/2023 105     Co2 10/28/2023 22     Anion Gap 10/28/2023 12.0     Glucose 10/28/2023 96     Bun 10/28/2023 9     Creatinine 10/28/2023 0.49 (L)     Calcium 10/28/2023 9.4     Correct Calcium 10/28/2023 9.2     AST(SGOT) 10/28/2023 24     ALT(SGPT) 10/28/2023 25     Alkaline Phosphatase 10/28/2023 81     Total Bilirubin 10/28/2023 0.3     Albumin 10/28/2023 4.2     Total Protein 10/28/2023 7.5     Globulin 10/28/2023 3.3     A-G Ratio 10/28/2023 1.3     Fasting Status 10/28/2023 Fasting     GFR (CKD-EPI) 10/28/2023 126    Hospital Outpatient Visit on 10/25/2023   Component Date Value    Glycohemoglobin 10/25/2023 5.3     Est Avg Glucose 10/25/2023 105     WBC 10/25/2023 9.2     RBC 10/25/2023 4.47     Hemoglobin 10/25/2023 13.1     Hematocrit 10/25/2023 39.6     MCV 10/25/2023 88.6     MCH 10/25/2023 29.3     MCHC 10/25/2023 33.1     RDW 10/25/2023 46.4     Platelet Count 10/25/2023 267     MPV 10/25/2023 11.3     Neutrophils-Polys 10/25/2023 59.70     Lymphocytes 10/25/2023 31.80     Monocytes 10/25/2023 6.20     Eosinophils 10/25/2023 1.30     Basophils 10/25/2023 0.90     Immature  Granulocytes 10/25/2023 0.10     Nucleated RBC 10/25/2023 0.00     Neutrophils (Absolute) 10/25/2023 5.48     Lymphs (Absolute) 10/25/2023 2.92     Monos (Absolute) 10/25/2023 0.57     Eos (Absolute) 10/25/2023 0.12     Baso (Absolute) 10/25/2023 0.08     Immature Granulocytes (a* 10/25/2023 0.01     NRBC (Absolute) 10/25/2023 0.00     Hepatitis C Antibody 10/25/2023 Non-Reactive     HIV Ag/Ab Combo Assay 10/25/2023 Non-Reactive          Assessment & Plan: Medical Decision Making     33 y.o. female with the following -     Problem List Items Addressed This Visit       Chronic right-sided low back pain with right-sided sciatica     Chronic in nature, uncontrolled  - Recommend referral to physiatry and physical therapy  - Imaging ordered given history of trauma and radiculopathy  - Patient had recent bariatric surgery, therefore will avoid oral NSAIDs/steroids. Recommend Lidocaine patch daily and Tylenol 500-1000 mg every 8 hours as needed for pain         Relevant Medications    Lidocaine-Menthol 4-1 % Patch    Other Relevant Orders    Referral to Physical Therapy    DX-LUMBAR SPINE-2 OR 3 VIEWS    MR-LUMBAR SPINE-W/O    Referral to Pain Clinic       Differential diagnosis, natural history, supportive care, and indications for immediate follow-up discussed.  Shared decision making approach utilized, and patient is amendable with plan of care.  Patient understands to return to clinic or go to the emergency department if symptoms worsen. All questions and concerns addressed to the best of my knowledge.    Return if symptoms worsen or fail to improve.    Please note that this dictation was created using voice recognition software. I have made every reasonable attempt to correct obvious errors, but I expect that there are errors of grammar and possibly content that I did not discover before finalizing the note.

## 2023-11-11 ENCOUNTER — HOSPITAL ENCOUNTER (OUTPATIENT)
Dept: RADIOLOGY | Facility: MEDICAL CENTER | Age: 33
End: 2023-11-11
Payer: COMMERCIAL

## 2023-11-11 DIAGNOSIS — M54.41 CHRONIC RIGHT-SIDED LOW BACK PAIN WITH RIGHT-SIDED SCIATICA: ICD-10-CM

## 2023-11-11 DIAGNOSIS — G89.29 CHRONIC RIGHT-SIDED LOW BACK PAIN WITH RIGHT-SIDED SCIATICA: ICD-10-CM

## 2023-11-11 PROCEDURE — 72100 X-RAY EXAM L-S SPINE 2/3 VWS: CPT

## 2024-01-03 ENCOUNTER — APPOINTMENT (OUTPATIENT)
Dept: RADIOLOGY | Facility: MEDICAL CENTER | Age: 34
End: 2024-01-03
Payer: COMMERCIAL

## 2024-01-10 ENCOUNTER — NON-PROVIDER VISIT (OUTPATIENT)
Dept: MEDICAL GROUP | Facility: PHYSICIAN GROUP | Age: 34
End: 2024-01-10
Payer: COMMERCIAL

## 2024-01-10 ENCOUNTER — OFFICE VISIT (OUTPATIENT)
Dept: MEDICAL GROUP | Facility: PHYSICIAN GROUP | Age: 34
End: 2024-01-10
Payer: COMMERCIAL

## 2024-01-10 DIAGNOSIS — Z23 NEED FOR VACCINATION: ICD-10-CM

## 2024-01-10 PROCEDURE — 99999 PR NO CHARGE: CPT | Mod: MISDOCU

## 2024-01-10 PROCEDURE — 90746 HEPB VACCINE 3 DOSE ADULT IM: CPT

## 2024-01-10 PROCEDURE — 90472 IMMUNIZATION ADMIN EACH ADD: CPT

## 2024-01-10 PROCEDURE — 90471 IMMUNIZATION ADMIN: CPT

## 2024-01-10 PROCEDURE — 90632 HEPA VACCINE ADULT IM: CPT

## 2024-01-10 NOTE — PROGRESS NOTES
"Alpa Ordoñez is a 33 y.o. female here for a non-provider visit for:   HEPATITIS A 2 of 3  HEPATITIS B 2 of 3    Reason for immunization: continue or complete series started at the office  Immunization records indicate need for vaccine: Yes, confirmed with Epic  Minimum interval has been met for this vaccine: Yes  ABN completed: Yes    VIS Dated  10/15/2021, 5/12/2023 was given to patient: Yes  All IAC Questionnaire questions were answered \"No.\"    Patient tolerated injection and no adverse effects were observed or reported: Yes    Pt scheduled for next dose in series: Yes   "

## 2024-01-11 NOTE — PROGRESS NOTES
Subjective:     CC: erroneous encounter, just needed vaccine, turned into MA visit    HPI:   Alpa presents today with    No problems updated.      ROS:  ROS    Objective:     Exam:  There were no vitals taken for this visit. There is no height or weight on file to calculate BMI.    Physical Exam    Labs:     Assessment & Plan: Medical Decision Making     33 y.o. female with the following -     Problem List Items Addressed This Visit    None  Visit Diagnoses       Need for vaccination        Relevant Orders    Hepatitis B Vaccine Adult 20+ (Completed)    Hep A Adult 19+ (Completed)            Differential diagnosis, natural history, supportive care, and indications for immediate follow-up discussed.  Shared decision making approach utilized, and patient is amendable with plan of care.  Patient understands to return to clinic or go to the emergency department if symptoms worsen. All questions and concerns addressed to the best of my knowledge.    No follow-ups on file.    Please note that this dictation was created using voice recognition software. I have made every reasonable attempt to correct obvious errors, but I expect that there are errors of grammar and possibly content that I did not discover before finalizing the note.

## 2024-01-22 ENCOUNTER — APPOINTMENT (OUTPATIENT)
Dept: RADIOLOGY | Facility: MEDICAL CENTER | Age: 34
End: 2024-01-22
Payer: COMMERCIAL

## 2024-01-22 DIAGNOSIS — G89.29 CHRONIC RIGHT-SIDED LOW BACK PAIN WITH RIGHT-SIDED SCIATICA: ICD-10-CM

## 2024-01-22 DIAGNOSIS — M54.41 CHRONIC RIGHT-SIDED LOW BACK PAIN WITH RIGHT-SIDED SCIATICA: ICD-10-CM

## 2024-01-22 PROCEDURE — 72148 MRI LUMBAR SPINE W/O DYE: CPT

## 2024-03-07 DIAGNOSIS — M54.41 CHRONIC RIGHT-SIDED LOW BACK PAIN WITH RIGHT-SIDED SCIATICA: ICD-10-CM

## 2024-03-07 DIAGNOSIS — G89.29 CHRONIC RIGHT-SIDED LOW BACK PAIN WITH RIGHT-SIDED SCIATICA: ICD-10-CM

## 2024-03-29 ENCOUNTER — OFFICE VISIT (OUTPATIENT)
Dept: URGENT CARE | Facility: CLINIC | Age: 34
End: 2024-03-29
Payer: COMMERCIAL

## 2024-03-29 ENCOUNTER — HOSPITAL ENCOUNTER (OUTPATIENT)
Dept: LAB | Facility: MEDICAL CENTER | Age: 34
End: 2024-03-29
Attending: REGISTERED NURSE
Payer: COMMERCIAL

## 2024-03-29 ENCOUNTER — HOSPITAL ENCOUNTER (OUTPATIENT)
Facility: MEDICAL CENTER | Age: 34
End: 2024-03-29
Attending: REGISTERED NURSE
Payer: COMMERCIAL

## 2024-03-29 ENCOUNTER — APPOINTMENT (OUTPATIENT)
Dept: RADIOLOGY | Facility: IMAGING CENTER | Age: 34
End: 2024-03-29
Attending: REGISTERED NURSE
Payer: COMMERCIAL

## 2024-03-29 VITALS
HEART RATE: 75 BPM | SYSTOLIC BLOOD PRESSURE: 108 MMHG | OXYGEN SATURATION: 100 % | TEMPERATURE: 97.9 F | DIASTOLIC BLOOD PRESSURE: 64 MMHG | HEIGHT: 62 IN | RESPIRATION RATE: 14 BRPM | BODY MASS INDEX: 26.13 KG/M2 | WEIGHT: 142 LBS

## 2024-03-29 DIAGNOSIS — R10.9 LEFT FLANK PAIN: ICD-10-CM

## 2024-03-29 DIAGNOSIS — H92.01 ACUTE OTALGIA, RIGHT: ICD-10-CM

## 2024-03-29 DIAGNOSIS — M54.50 LEFT LUMBAR PAIN: ICD-10-CM

## 2024-03-29 LAB
ALBUMIN SERPL BCP-MCNC: 4.7 G/DL (ref 3.2–4.9)
ALBUMIN/GLOB SERPL: 1.5 G/DL
ALP SERPL-CCNC: 71 U/L (ref 30–99)
ALT SERPL-CCNC: 16 U/L (ref 2–50)
ANION GAP SERPL CALC-SCNC: 14 MMOL/L (ref 7–16)
APPEARANCE UR: CLEAR
AST SERPL-CCNC: 18 U/L (ref 12–45)
BASOPHILS # BLD AUTO: 0.6 % (ref 0–1.8)
BASOPHILS # BLD: 0.06 K/UL (ref 0–0.12)
BILIRUB SERPL-MCNC: 0.5 MG/DL (ref 0.1–1.5)
BILIRUB UR STRIP-MCNC: NORMAL MG/DL
BUN SERPL-MCNC: 12 MG/DL (ref 8–22)
CALCIUM ALBUM COR SERPL-MCNC: 9 MG/DL (ref 8.5–10.5)
CALCIUM SERPL-MCNC: 9.6 MG/DL (ref 8.5–10.5)
CHLORIDE SERPL-SCNC: 103 MMOL/L (ref 96–112)
CO2 SERPL-SCNC: 23 MMOL/L (ref 20–33)
COLOR UR AUTO: YELLOW
CREAT SERPL-MCNC: 0.63 MG/DL (ref 0.5–1.4)
EOSINOPHIL # BLD AUTO: 0.05 K/UL (ref 0–0.51)
EOSINOPHIL NFR BLD: 0.5 % (ref 0–6.9)
ERYTHROCYTE [DISTWIDTH] IN BLOOD BY AUTOMATED COUNT: 43.9 FL (ref 35.9–50)
GFR SERPLBLD CREATININE-BSD FMLA CKD-EPI: 118 ML/MIN/1.73 M 2
GLOBULIN SER CALC-MCNC: 3.2 G/DL (ref 1.9–3.5)
GLUCOSE SERPL-MCNC: 87 MG/DL (ref 65–99)
GLUCOSE UR STRIP.AUTO-MCNC: NORMAL MG/DL
HCT VFR BLD AUTO: 41.4 % (ref 37–47)
HGB BLD-MCNC: 13.6 G/DL (ref 12–16)
IMM GRANULOCYTES # BLD AUTO: 0.01 K/UL (ref 0–0.11)
IMM GRANULOCYTES NFR BLD AUTO: 0.1 % (ref 0–0.9)
KETONES UR STRIP.AUTO-MCNC: NORMAL MG/DL
LEUKOCYTE ESTERASE UR QL STRIP.AUTO: NORMAL
LYMPHOCYTES # BLD AUTO: 2.81 K/UL (ref 1–4.8)
LYMPHOCYTES NFR BLD: 30.2 % (ref 22–41)
MCH RBC QN AUTO: 29.4 PG (ref 27–33)
MCHC RBC AUTO-ENTMCNC: 32.9 G/DL (ref 32.2–35.5)
MCV RBC AUTO: 89.6 FL (ref 81.4–97.8)
MONOCYTES # BLD AUTO: 0.7 K/UL (ref 0–0.85)
MONOCYTES NFR BLD AUTO: 7.5 % (ref 0–13.4)
NEUTROPHILS # BLD AUTO: 5.66 K/UL (ref 1.82–7.42)
NEUTROPHILS NFR BLD: 61.1 % (ref 44–72)
NITRITE UR QL STRIP.AUTO: NORMAL
NRBC # BLD AUTO: 0 K/UL
NRBC BLD-RTO: 0 /100 WBC (ref 0–0.2)
PH UR STRIP.AUTO: 6.5 [PH] (ref 5–8)
PLATELET # BLD AUTO: 244 K/UL (ref 164–446)
PMV BLD AUTO: 11.5 FL (ref 9–12.9)
POTASSIUM SERPL-SCNC: 4.5 MMOL/L (ref 3.6–5.5)
PROT SERPL-MCNC: 7.9 G/DL (ref 6–8.2)
PROT UR QL STRIP: NORMAL MG/DL
RBC # BLD AUTO: 4.62 M/UL (ref 4.2–5.4)
RBC UR QL AUTO: NORMAL
SODIUM SERPL-SCNC: 140 MMOL/L (ref 135–145)
SP GR UR STRIP.AUTO: 1.02
UROBILINOGEN UR STRIP-MCNC: 0.2 MG/DL
WBC # BLD AUTO: 9.3 K/UL (ref 4.8–10.8)

## 2024-03-29 PROCEDURE — 85025 COMPLETE CBC W/AUTO DIFF WBC: CPT

## 2024-03-29 PROCEDURE — 87086 URINE CULTURE/COLONY COUNT: CPT

## 2024-03-29 PROCEDURE — 36415 COLL VENOUS BLD VENIPUNCTURE: CPT

## 2024-03-29 PROCEDURE — 80053 COMPREHEN METABOLIC PANEL: CPT

## 2024-03-29 PROCEDURE — 74019 RADEX ABDOMEN 2 VIEWS: CPT | Mod: TC | Performed by: RADIOLOGY

## 2024-03-29 ASSESSMENT — ENCOUNTER SYMPTOMS
CHILLS: 0
ABDOMINAL PAIN: 0
BLOOD IN STOOL: 0
FEVER: 0
DIZZINESS: 0
VOMITING: 0
DIARRHEA: 0
HEADACHES: 0
COUGH: 0

## 2024-03-29 ASSESSMENT — FIBROSIS 4 INDEX: FIB4 SCORE: 0.63

## 2024-03-29 NOTE — PROGRESS NOTES
Subjective:   Alpa Ordoñez is a 33 y.o. female who presents for Side Pain (Left side lower pain(LLQ), headache x 2 days only in the morning, sore throat pain into right ear/)    HPI  Left flank versus left lumbar back pain x 2 weeks. No pattern. Worse with laughing, not movement or activities. Hx of lumbar back. No treatments tried. One month ago she took a few falls while playing roller [place]. No recent UTI, no kidney infections, never hospitalized for this. Last bowel movement was this morning.  Does have history of gastric sleeve.  No daily alcohol or NSAID use.  Adamantly denies pregnancy    Also having some right sided throat pain that radiates into the right ear.     Has family members who have been diagnosed with cancer of the spleen and would like basic blood work.    Review of Systems   Constitutional:  Negative for chills and fever.   Respiratory:  Negative for cough.    Cardiovascular:  Negative for chest pain.   Gastrointestinal:  Negative for abdominal pain, blood in stool, diarrhea, melena and vomiting.   Genitourinary:  Negative for dysuria, frequency, hematuria and urgency.   Skin:  Negative for rash.   Neurological:  Negative for dizziness and headaches.       Allergies   Allergen Reactions    Penicillins Swelling     Vaginal infection        Patient Active Problem List    Diagnosis Date Noted    Chronic right-sided low back pain with right-sided sciatica 11/01/2023    H/O gastric sleeve 10/25/2023    Overweight (BMI 25.0-29.9) 10/25/2023    Coccyx pain 12/13/2022    BMI 33.0-33.9,adult 12/13/2022    Dyslipidemia 12/13/2022    Vitamin D deficiency 12/13/2022    Elevated fasting glucose 12/13/2022    S/P laparoscopy 08/30/2021    Status post bilateral salpingectomy 08/30/2021    S/P endometrial ablation 08/30/2021    Encounter for sterilization education 08/17/2021       Current Outpatient Medications Ordered in Epic   Medication Sig Dispense Refill    Non Formulary Request Ketamine 80 mg       No  current Epic-ordered facility-administered medications on file.       Past Surgical History:   Procedure Laterality Date    UT LAP,DIAGNOSTIC ABDOMEN N/A 8/30/2021    Procedure: PELVISCOPY;  Surgeon: Pramod Hogan M.D.;  Location: SURGERY SAME DAY [facility];  Service: Gynecology    UT HYSTEROSCOPY,W/ENDOMETRIAL ABLATION N/A 8/30/2021    Procedure: ABLATION, ENDOMETRIUM, THERMAL, HYSTEROSCOPIC;  Surgeon: Pramod Hogan M.D.;  Location: SURGERY SAME DAY [facility];  Service: Gynecology    SALPINGECTOMY Bilateral 8/30/2021    Procedure: SALPINGECTOMY;  Surgeon: Pramod Hogan M.D.;  Location: SURGERY SAME DAY [facility];  Service: Gynecology    OVARIAN CYSTECTOMY Left 8/30/2021    Procedure: EXCISION, CYST, OVARY;  Surgeon: Pramod Hogan M.D.;  Location: SURGERY SAME DAY [facility];  Service: Gynecology    CHOLECYSTECTOMY  2011       Social History     Tobacco Use    Smoking status: Former     Current packs/day: 0.00     Types: Cigarettes     Start date: 1/1/2002     Quit date: 1/1/2009     Years since quitting: 15.2    Smokeless tobacco: Never   Vaping Use    Vaping Use: Some days    Start date: 1/1/2015    Substances: THC, CBD   Substance Use Topics    Alcohol use: Yes     Comment: once per month    Drug use: Yes     Types: Marijuana, Inhaled       family history includes Allergies in her brother; Alzheimer's Disease in her paternal grandfather; Arthritis in her maternal grandmother; Cancer in her maternal aunt and paternal grandmother; Depression in her mother; Diabetes in her maternal grandfather and maternal grandmother; Hypertension in her maternal grandmother, paternal grandfather, and paternal grandmother; Obesity in her father; Other in her maternal aunt and mother; Parkinson's Disease in her paternal grandfather.     Problem list, medications, and allergies reviewed by myself today in Epic.     Objective:   /64 (BP Location: Left arm, Patient Position: Sitting, BP Cuff Size: Adult)   Pulse 75    "Temp 36.6 °C (97.9 °F) (Temporal)   Resp 14   Ht 1.575 m (5' 2\")   Wt 64.4 kg (142 lb)   SpO2 100%   BMI 25.97 kg/m²     Physical Exam  Vitals and nursing note reviewed.   Constitutional:       Appearance: Normal appearance. She is not ill-appearing or toxic-appearing.   HENT:      Right Ear: Tympanic membrane, ear canal and external ear normal.      Left Ear: Tympanic membrane, ear canal and external ear normal.      Nose: No congestion.      Mouth/Throat:      Mouth: Mucous membranes are moist.      Pharynx: No oropharyngeal exudate or posterior oropharyngeal erythema.      Comments: Clear voice.  Managing oral secretions.  Eyes:      Pupils: Pupils are equal, round, and reactive to light.   Cardiovascular:      Rate and Rhythm: Normal rate and regular rhythm.   Pulmonary:      Effort: Pulmonary effort is normal. No respiratory distress.      Breath sounds: Normal breath sounds.   Abdominal:      General: Abdomen is flat. Bowel sounds are normal.      Tenderness: There is no abdominal tenderness. There is no right CVA tenderness, left CVA tenderness, guarding or rebound.   Musculoskeletal:         General: Normal range of motion.      Cervical back: Normal range of motion. No rigidity.        Back:    Lymphadenopathy:      Cervical: No cervical adenopathy.   Skin:     General: Skin is warm and dry.      Capillary Refill: Capillary refill takes less than 2 seconds.      Findings: No rash.   Neurological:      General: No focal deficit present.      Mental Status: She is alert and oriented to person, place, and time.   Psychiatric:         Mood and Affect: Mood normal.         RADIOLOGY RESULTS   [id number] VIEWS    Result Date: 3/29/2024  3/29/2024 9:52 AM HISTORY/REASON FOR EXAM:  Left Flank Pain. TECHNIQUE/EXAM DESCRIPTION AND NUMBER OF VIEWS:  3 views of the abdomen. COMPARISON: None FINDINGS: Nonspecific bowel gas pattern. No evidence of bowel obstruction. No free air under the diaphragm. Multiple " surgical clips in the upper abdomen.     No evidence of bowel obstruction.           Assessment/Plan:     I personally reviewed prior external notes and test results pertinent to today's visit as well as additional imaging and testing completed in clinic today.     1. Left flank pain  POCT Urinalysis    [id number] VIEWS    CBC WITH DIFFERENTIAL    Comp Metabolic Panel      2. Acute otalgia, right        3. Left lumbar pain  CBC WITH DIFFERENTIAL    Comp Metabolic Panel        TESTING: In clinic UA not indicative of infection    Vital signs are all within normal limits.      PLAN/MDM: Patient presenting with 2 weeks of intermittent left flank and lumbar pain.  Does have history of chronic lumbar issues.  Unable to take NSAIDs due to gastric procedure.  There is no trauma or injury that she recalls but does participate in roller [place] and had a big fall 1 month ago.  Not having any urinary symptoms.  There is no abdominal pain.  No vomiting or diarrhea.  No fevers.  On exam she does appear well and nontoxic no reproducible tenderness, no vertebral tenderness, neurovascular intact distally x 4 extremities.  Normal ENT findings.  Having regular bowel movements.  Did complete a chest x-ray which I reviewed and shows no obstruction or significant retained stool.  Overall unremarkable exam.  Will order some basic lab work.  Will also send urine for culture.  She is given strict ER precautions.      Shared decision-making was utilized with patient for treatment plan. Differential Diagnosis, natural history, and supportive care discussed.     Medication discussed included indication for use and the potential benefits and side effects. Education was provided regarding the aforementioned assessments. All of the patient's questions were answered to their satisfaction at the time of discharge. Patient was encouraged to monitor symptoms closely. Those signs and symptoms which would warrant concern and mandate seeking a higher  level of service through the emergency department discussed at length. Patient stated agreement and understanding of this plan of care.     Please note that this dictation was created using voice recognition software. I have made every reasonable attempt to correct obvious errors, but I expect that there are errors of grammar and possibly content that I did not discover before finalizing the note.    This note was electronically signed by LELA Duque

## 2024-03-31 LAB
BACTERIA UR CULT: NORMAL
SIGNIFICANT IND 70042: NORMAL
SITE SITE: NORMAL
SOURCE SOURCE: NORMAL

## 2024-04-07 NOTE — OR SURGEON
Immediate Post OP Note    PreOp Diagnosis: 1. Encounter for sterilization  2. HX of menorrhagia  3. Simple left ovarian cyst      PostOp Diagnosis: same      Procedure(s):  1. PELVISCOPY - Wound Class: Clean Contaminated  2. Bilateral SALPINGECTOMY - Wound Class: Clean Contaminated  3. ABLATION, ENDOMETRIUM, Hydrothermal, HYSTEROSCOPIC - Wound Class: Clean Contaminated  4. Drainage, CYST, left OVARY - Wound Class: Clean Contaminated  5. Endometrial curetting    Surgeon(s):  Pramod Hogan M.D.    Assistant:  Ruthann Lake D.O.          Anesthesiologist/Type of Anesthesia:  Anesthesiologist: Adam Heller M.D./General    Surgical Staff:  Circulator: SEVEN Ramírez Circulator: Gaby Sanchez R.N.  Relief Scrub: Adilson Muir  Scrub Person: Len Fox    Specimens removed if any:  ID Type Source Tests Collected by Time Destination   A : endometrial curettings Tissue Endometrium PATHOLOGY SPECIMEN Pramod Hogan M.D. 8/30/2021 12:04 PM    B : bilateral fallopian tubes Tissue Fallopian Tube PATHOLOGY SPECIMEN Pramod Hogan M.D. 8/30/2021  1:00 PM        Estimated Blood Loss: 25 ml  Urine: 150 ml   IVF: 2L LR    Findings: No endometrial mass on hysteroscopy, narrow endometrial cavity, Uterus sounded to 7.5 cm. On laparoscopy, NL uterus, tubes and right ovary noted. Large simple left ovarian cyst noted.    Complications: none apparent        8/30/2021 1:36 PM Pramod Hogan M.D.  
1 or 2

## 2024-04-29 ENCOUNTER — APPOINTMENT (OUTPATIENT)
Dept: MEDICAL GROUP | Facility: PHYSICIAN GROUP | Age: 34
End: 2024-04-29
Payer: COMMERCIAL

## 2024-04-29 ENCOUNTER — TELEPHONE (OUTPATIENT)
Dept: MEDICAL GROUP | Facility: PHYSICIAN GROUP | Age: 34
End: 2024-04-29

## 2024-04-29 DIAGNOSIS — Z23 NEED FOR VACCINATION: ICD-10-CM

## 2024-04-29 NOTE — PROGRESS NOTES
"Alpa Ordoñez is a 33 y.o. female here for a non-provider visit for:   HEPATITIS B 3 of 3    Reason for immunization: continue or complete series started at the office  Immunization records indicate need for vaccine: Yes, confirmed with Epic and confirmed with [place] [employer]  Minimum interval has been met for this vaccine: Yes  ABN completed: Not Indicated    VIS Dated  Yes was given to patient: Yes  All IAC Questionnaire questions were answered \"No.\"    Patient tolerated injection and no adverse effects were observed or reported: Yes    Pt scheduled for next dose in series: Not Indicated  "

## 2024-05-03 ENCOUNTER — APPOINTMENT (OUTPATIENT)
Dept: OBGYN | Facility: CLINIC | Age: 34
End: 2024-05-03
Payer: COMMERCIAL

## 2024-05-03 ENCOUNTER — HOSPITAL ENCOUNTER (OUTPATIENT)
Facility: MEDICAL CENTER | Age: 34
End: 2024-05-03
Attending: PHYSICIAN ASSISTANT
Payer: COMMERCIAL

## 2024-05-03 VITALS
HEIGHT: 63 IN | BODY MASS INDEX: 24.63 KG/M2 | WEIGHT: 139 LBS | SYSTOLIC BLOOD PRESSURE: 108 MMHG | DIASTOLIC BLOOD PRESSURE: 61 MMHG

## 2024-05-03 DIAGNOSIS — Z01.419 WELL WOMAN EXAM: ICD-10-CM

## 2024-05-03 DIAGNOSIS — Z12.4 SCREENING FOR CERVICAL CANCER: ICD-10-CM

## 2024-05-03 PROCEDURE — 3078F DIAST BP <80 MM HG: CPT | Performed by: PHYSICIAN ASSISTANT

## 2024-05-03 PROCEDURE — 3074F SYST BP LT 130 MM HG: CPT | Performed by: PHYSICIAN ASSISTANT

## 2024-05-03 PROCEDURE — 99395 PREV VISIT EST AGE 18-39: CPT | Performed by: PHYSICIAN ASSISTANT

## 2024-05-03 ASSESSMENT — FIBROSIS 4 INDEX: FIB4 SCORE: 0.65

## 2024-05-03 NOTE — PROGRESS NOTES
ANNUAL GYNECOLOGY VISIT    Chief Complaint  Annual    Subjective  Alpa Tiago is a 33 y.o. female  with patient's last menstrual period was 2024 s/p   ablation in  due to heavy menses. Pt also had BTL in  for contraception. Ppresents today for Annual Exam. Pt does report vulvar sensitivity with intercourse s/p gastric sleeve with significant weight loss.     Preventive Care   Immunization History   Administered Date(s) Administered    Hepatitis A Vaccine, Adult 10/25/2023, 01/10/2024    Hepatitis B Vaccine (Adol/Adult) 10/25/2023, 01/10/2024, 2024    Destiney SARS-CoV-2 Vaccine 2021, 2021    Tdap Vaccine 10/25/2023       Gynecology History and ROS  Current Sexual Activity: yes - monogamous partner, has been sexually active with both male and female partners   History of sexually transmitted diseases? no  Abnormal discharge? no  Current Contraception:  BTL    Menstrual History  Light menses s/p ablation in .  Periods are regular  q 28-30 days, lasting 5-7 days.   Clots or heavy flow: no  Dysmenorrhea: no  Intermenstrual bleeding/spotting: no  Significant pain with periods:no  Bothersome PMS symptoms: no  Significant Pelvic Pain: no    Pap History  Last pap smear:  neg cotesting  History of moderate or severe dysplasia: no    Cancer Risk Assessement:  Family history of:   - Breast cancer: no   - Ovarian cancer: Maternal Aunt   - Uterine cancer: no   - Colon cancer: Maternal aunt    Obstetric History  OB History    Para Term  AB Living   0 0 0 0 0 0   SAB IAB Ectopic Molar Multiple Live Births   0 0 0 0 0 0       Past Medical History  Past Medical History:   Diagnosis Date    Allergy     receving vaccnines to help    Anemia     Anxiety     Bowel habit changes 2021    Possible IBS    Bronchitis     2017    Depression     Diabetes (HCC) 2021    pre diabetic    GERD (gastroesophageal reflux disease)     High cholesterol 2021    no need for  medication at this time       Past Surgical History  Past Surgical History:   Procedure Laterality Date    KS LAP,DIAGNOSTIC ABDOMEN N/A 8/30/2021    Procedure: PELVISCOPY;  Surgeon: Pramod Hogan M.D.;  Location: SURGERY SAME DAY [facility];  Service: Gynecology    KS HYSTEROSCOPY,W/ENDOMETRIAL ABLATION N/A 8/30/2021    Procedure: ABLATION, ENDOMETRIUM, THERMAL, HYSTEROSCOPIC;  Surgeon: Pramod Hogan M.D.;  Location: SURGERY SAME DAY [facility];  Service: Gynecology    SALPINGECTOMY Bilateral 8/30/2021    Procedure: SALPINGECTOMY;  Surgeon: Pramod Hogan M.D.;  Location: SURGERY SAME DAY [facility];  Service: Gynecology    OVARIAN CYSTECTOMY Left 8/30/2021    Procedure: EXCISION, CYST, OVARY;  Surgeon: Pramod Hogan M.D.;  Location: SURGERY SAME DAY [facility];  Service: Gynecology    CHOLECYSTECTOMY  2011       Social History  Social History     Tobacco Use    Smoking status: Former     Current packs/day: 0.00     Types: Cigarettes     Start date: 1/1/2002     Quit date: 1/1/2009     Years since quitting: 15.3    Smokeless tobacco: Never   Vaping Use    Vaping Use: Some days    Start date: 1/1/2015    Substances: THC, CBD   Substance Use Topics    Alcohol use: Yes     Comment: once per month    Drug use: Not Currently     Types: Marijuana, Inhaled        Family History  Family History   Problem Relation Age of Onset    Depression Mother     Other Mother         fatigue and dizziness    Obesity Father     Allergies Brother     Cancer Maternal Aunt         cervical    Other Maternal Aunt         fibroids    Colon Cancer Maternal Aunt     Diabetes Paternal Aunt     Heart Disease Maternal Grandmother     Arthritis Maternal Grandmother     Hypertension Maternal Grandmother     Diabetes Maternal Grandmother     Heart Disease Maternal Grandfather     Diabetes Maternal Grandfather     Heart Disease Paternal Grandmother     Cancer Paternal Grandmother         ovarian    Hypertension Paternal Grandmother     Heart  "Disease Paternal Grandfather     Hypertension Paternal Grandfather     Parkinson's Disease Paternal Grandfather     Alzheimer's Disease Paternal Grandfather        Home Medications  Current Outpatient Medications   Medication Sig    Non Formulary Request Ketamine 80 mg       Allergies/Reactions  Allergies   Allergen Reactions    Penicillins Swelling     Vaginal infection        ROS  Positive ROS: none   Gen: no fevers or chills, no significant weight loss or gain, excessive fatigue  Respiratory:  no cough or dyspnea  Cardiac:  no chest pain, no palpitations, no syncope  Breast: no breast discharge, pain, lump or skin changes  GI:  no heartburn, no abdominal pain, no nausea or vomiting  Urinary: no dysuria, urgency, frequency, incontinence   Psych: no depression or anxiety  Neuro: no migraines with aura, fainting spells, numbness or tingling  Extremities: no joint pain, persistently swollen ankles, recurrent leg cramps      Physical Examination:  Vital Signs: /61 (BP Location: Left arm, Patient Position: Sitting, BP Cuff Size: Adult)   Ht 5' 2.5\"   Wt 139 lb   BMI 25.02 kg/m²       Constitutional: The patient is well developed and well nourished.  Psychiatric: Patient is oriented to time place and person.   Skin: No rash observed.  Neck: Appears symmetric. Thyroid normal size  Respiratory: normal effort  Breast: Inspection reveals no asymetry or nipple discharge, no skin thickening, dimpling or erythema.  Palpation demonstrates no masses.  Abdomen: Soft, non-tender.  Pelvic Exam:      Vulva: slightly loose labial skin but other wise normal external genitalia. No lesions     Urethra - no lesions, no erythema     Vagina: moist, pink, normal ruggae     Cervix: pink, smooth, no lesions, no CMT     Uterus: non-tender, normal size, shape, contour, mobile, retroverted     Ovaries: non-tender, no appreciable masses    Pap Smear performed: Yes    Chaperone Present: SYL Payne  Extremeties: Legs are symmetric and " without tenderness. There is no edema present.        Assessment & Plan  Alpa Ordoñez is a 33 y.o. female who presents today for Annual Gyn Exam.     1. Well woman exam     - Anticipatory guidance given. Encouraged adequate water intake, healthy diet, regular exercise. Educated on Pap smear screening and guidelines for age per ACOG and ASSCP. Discussed safe sex, STI prevention, contraception/family planning. Self breast exam taught.   - Advised sensitivity likely due to loose vulvar skin after weight loss. Trial of vaginal moisturizer such as Replens and lubricant with intercourse.     2. Screening for cervical cancer    - THINPREP PAP W/HPV AND CTNG; Future          Return: Annually or PRN    Kay Fry P.A.-C.  [facility]

## 2024-05-04 DIAGNOSIS — Z12.4 SCREENING FOR CERVICAL CANCER: ICD-10-CM

## 2024-05-08 LAB
C TRACH RRNA CVX QL NAA+PROBE: NEGATIVE
CYTOLOGIST CVX/VAG CYTO: NORMAL
CYTOLOGY CVX/VAG DOC CYTO: NORMAL
CYTOLOGY CVX/VAG DOC THIN PREP: NORMAL
HPV I/H RISK 4 DNA CVX QL PROBE+SIG AMP: NEGATIVE
N GONORRHOEA RRNA CVX QL NAA+PROBE: NEGATIVE
NOTE NL11727A: NORMAL
OTHER STN SPEC: NORMAL
STAT OF ADQ CVX/VAG CYTO-IMP: NORMAL

## 2024-11-18 DIAGNOSIS — R30.0 DYSURIA: ICD-10-CM

## 2024-11-19 ENCOUNTER — HOSPITAL ENCOUNTER (OUTPATIENT)
Dept: LAB | Facility: MEDICAL CENTER | Age: 34
End: 2024-11-19
Payer: COMMERCIAL

## 2024-11-19 DIAGNOSIS — R30.0 DYSURIA: ICD-10-CM

## 2024-11-19 LAB
APPEARANCE UR: CLEAR
BILIRUB UR QL STRIP.AUTO: NEGATIVE
COLOR UR: YELLOW
GLUCOSE UR STRIP.AUTO-MCNC: NEGATIVE MG/DL
KETONES UR STRIP.AUTO-MCNC: NEGATIVE MG/DL
LEUKOCYTE ESTERASE UR QL STRIP.AUTO: NEGATIVE
MICRO URNS: NORMAL
NITRITE UR QL STRIP.AUTO: NEGATIVE
PH UR STRIP.AUTO: 5.5 [PH] (ref 5–8)
PROT UR QL STRIP: NEGATIVE MG/DL
RBC UR QL AUTO: NEGATIVE
SP GR UR STRIP.AUTO: 1.03
UROBILINOGEN UR STRIP.AUTO-MCNC: 1 EU/DL

## 2024-11-19 PROCEDURE — 81003 URINALYSIS AUTO W/O SCOPE: CPT

## (undated) DEVICE — CANISTER SUCTION RIGID RED 1500CC (40EA/CA)

## (undated) DEVICE — SET IRRIGATION CYSTOSCOPY TUBE L80 IN (20EA/CA)

## (undated) DEVICE — ELECTRODE DUAL RETURN W/ CORD - (50/PK)

## (undated) DEVICE — GOWN SURGEONS X-LARGE - DISP. (30/CA)

## (undated) DEVICE — KIT ANESTHESIA W/CIRCUIT & 3/LT BAG W/FILTER (20EA/CA)

## (undated) DEVICE — SLEEVE VASO CALF MED - (10PR/CA)

## (undated) DEVICE — NEPTUNE 4 PORT MANIFOLD - (20/PK)

## (undated) DEVICE — Device

## (undated) DEVICE — SODIUM CHL. IRRIGATION 0.9% 3000ML (4EA/CA 65CA/PF)

## (undated) DEVICE — TUBING SETDISPOS HIGH FLOW II - (10/BX)

## (undated) DEVICE — ARMREST CRADLE FOAM - (2PR/PK 12PR/CA)

## (undated) DEVICE — CATHETER IV 20 GA X 1-1/4 ---SURG.& SDS ONLY--- (50EA/BX)

## (undated) DEVICE — LACTATED RINGERS INJ 1000 ML - (14EA/CA 60CA/PF)

## (undated) DEVICE — DRAPE UNDER BUTTOCKS FLUID - (20/CA)

## (undated) DEVICE — TUBE CONNECTING SUCTION - CLEAR PLASTIC STERILE 72 IN (50EA/CA)

## (undated) DEVICE — GLOVE BIOGEL PI INDICATOR SZ 8.0 SURGICAL PF LF -(50/BX 4BX/CA)

## (undated) DEVICE — HEMOSTAT ARISTA PWD 3 GRAM - (5/CA)

## (undated) DEVICE — GLOVE BIOGEL SZ 6.5 SURGICAL PF LTX (50PR/BX 4BX/CA)

## (undated) DEVICE — SUTURE GENERAL

## (undated) DEVICE — TOWEL STOP TIMEOUT SAFETY FLAG (40EA/CA)

## (undated) DEVICE — SODIUM CHL IRRIGATION 0.9% 1000ML (12EA/CA)

## (undated) DEVICE — WATER IRRIGATION STERILE 1000ML (12EA/CA)

## (undated) DEVICE — APPICATOR HEMOSTAT ARISTA XL - FLEXITIP (10EA/CA)

## (undated) DEVICE — PACK LAPAROSCOPY - (1/CA)

## (undated) DEVICE — CANISTER SUCTION 3000ML MECHANICAL FILTER AUTO SHUTOFF MEDI-VAC NONSTERILE LF DISP  (40EA/CA)

## (undated) DEVICE — DRESSING NON-ADHERING 8 X 3 - (50/BX)

## (undated) DEVICE — NEEDLE INSFL 120MM 14GA VRRS - (20/BX)

## (undated) DEVICE — SUCTION INSTRUMENT YANKAUER BULBOUS TIP W/O VENT (50EA/CA)

## (undated) DEVICE — HEAD HOLDER JUNIOR/ADULT

## (undated) DEVICE — PROTECTOR ULNA NERVE - (36PR/CA)

## (undated) DEVICE — GOWN WARMING STANDARD FLEX - (30/CA)

## (undated) DEVICE — TROCAR 5X100 SEPARTATOR ADV - FIXATION (6/BX)

## (undated) DEVICE — CHLORAPREP 26 ML APPLICATOR - ORANGE TINT(25/CA)

## (undated) DEVICE — ADVANCED NOVASURE STERILE DEVICE (3EA/PK)

## (undated) DEVICE — GLOVE SZ 8 BIOGEL PI MICRO - PF LF (50PR/BX)

## (undated) DEVICE — PAD SANITARY 11IN MAXI IND WRAPPED  (12EA/PK 24PK/CA)

## (undated) DEVICE — ELECTRODE 850 FOAM ADHESIVE - HYDROGEL RADIOTRNSPRNT (50/PK)

## (undated) DEVICE — KIT HTA GENESYS - (5/BX)

## (undated) DEVICE — GLOVE BIOGEL SZ 7 SURGICAL PF LTX - (50PR/BX 4BX/CA)

## (undated) DEVICE — LIGASURE LAPAROSCOPIC 5MM - (6EA/CA)

## (undated) DEVICE — KIT  I.V. START (100EA/CA)

## (undated) DEVICE — SENSOR SPO2 NEO LNCS ADHESIVE (20/BX) SEE USER NOTES

## (undated) DEVICE — SET LEADWIRE 5 LEAD BEDSIDE DISPOSABLE ECG (1SET OF 5/EA)

## (undated) DEVICE — MASK ANESTHESIA ADULT  - (100/CA)

## (undated) DEVICE — SUTURE 4-0 MONOCRYL PLUS PS-2 - 27 INCH (36/BX)

## (undated) DEVICE — TROCAR 5X100 BLADED ADVANCE - FIXATION (6/BX)

## (undated) DEVICE — SET EXTENSION WITH 2 PORTS (48EA/CA) ***PART #2C8610 IS A SUBSTITUTE*****

## (undated) DEVICE — TRAY SRGPRP PVP IOD WT PRP - (20/CA)

## (undated) DEVICE — TUBING CLEARLINK DUO-VENT - C-FLO (48EA/CA)

## (undated) DEVICE — DERMABOND ADVANCED - (12EA/BX)

## (undated) DEVICE — GOWN SURGICAL XX-LARGE - (28EA/CA) SIRUS NON REINFORCED